# Patient Record
Sex: MALE | Race: WHITE | Employment: FULL TIME | URBAN - NONMETROPOLITAN AREA
[De-identification: names, ages, dates, MRNs, and addresses within clinical notes are randomized per-mention and may not be internally consistent; named-entity substitution may affect disease eponyms.]

---

## 2017-05-16 ENCOUNTER — TELEPHONE (OUTPATIENT)
Dept: FAMILY MEDICINE CLINIC | Facility: CLINIC | Age: 62
End: 2017-05-16

## 2017-07-17 ENCOUNTER — TELEPHONE (OUTPATIENT)
Dept: FAMILY MEDICINE CLINIC | Facility: CLINIC | Age: 62
End: 2017-07-17

## 2017-07-17 NOTE — TELEPHONE ENCOUNTER
1st no show for [facility] 7/17/17.  Spoke with pt and explained policy

## 2017-07-19 ENCOUNTER — OFFICE VISIT (OUTPATIENT)
Dept: FAMILY MEDICINE CLINIC | Facility: CLINIC | Age: 62
End: 2017-07-19

## 2017-07-19 VITALS
TEMPERATURE: 98 F | BODY MASS INDEX: 29.73 KG/M2 | OXYGEN SATURATION: 99 % | WEIGHT: 203 LBS | SYSTOLIC BLOOD PRESSURE: 130 MMHG | HEART RATE: 74 BPM | DIASTOLIC BLOOD PRESSURE: 86 MMHG | HEIGHT: 69.25 IN

## 2017-07-19 DIAGNOSIS — Z79.899 ENCOUNTER FOR LONG-TERM (CURRENT) USE OF MEDICATIONS: Primary | ICD-10-CM

## 2017-07-19 DIAGNOSIS — Z12.11 COLON CANCER SCREENING: ICD-10-CM

## 2017-07-19 DIAGNOSIS — I10 ESSENTIAL HYPERTENSION, BENIGN: ICD-10-CM

## 2017-07-19 PROCEDURE — 99213 OFFICE O/P EST LOW 20 MIN: CPT | Performed by: FAMILY MEDICINE

## 2017-07-19 RX ORDER — LISINOPRIL 10 MG/1
10 TABLET ORAL DAILY
Qty: 90 TABLET | Refills: 0 | Status: SHIPPED | OUTPATIENT
Start: 2017-07-19 | End: 2019-05-13

## 2017-07-19 RX ORDER — AMLODIPINE BESYLATE 5 MG/1
5 TABLET ORAL DAILY
Qty: 90 TABLET | Refills: 1 | Status: SHIPPED | OUTPATIENT
Start: 2017-07-19 | End: 2019-05-13

## 2017-07-19 NOTE — PROGRESS NOTES
Med Pérez is a 57year old male. Patient presents with:  HTN: HAS BEEN TAKING MEDS 2-3 TIMES A WEEK, NOT DAILY---- RM 6      HPI:   Patient presents for recheck of his  hypertension.  Pt has been taking medications as instructed, no medication side e without murmur  EXTREMITIES: no cyanosis, clubbing or edema    ASSESSMENT AND PLAN:     Problem List Items Addressed This Visit        Cardiovascular    Essential hypertension, benign    Overview     Blood Pressure and Cardiac Medications          lisinopr

## 2017-08-22 ENCOUNTER — TELEPHONE (OUTPATIENT)
Dept: FAMILY MEDICINE CLINIC | Facility: CLINIC | Age: 62
End: 2017-08-22

## 2018-06-12 ENCOUNTER — OFFICE VISIT (OUTPATIENT)
Dept: FAMILY MEDICINE CLINIC | Facility: CLINIC | Age: 63
End: 2018-06-12

## 2018-06-12 VITALS
HEART RATE: 80 BPM | SYSTOLIC BLOOD PRESSURE: 138 MMHG | OXYGEN SATURATION: 98 % | TEMPERATURE: 99 F | WEIGHT: 201 LBS | BODY MASS INDEX: 29 KG/M2 | DIASTOLIC BLOOD PRESSURE: 86 MMHG

## 2018-06-12 DIAGNOSIS — L30.9 DERMATITIS: ICD-10-CM

## 2018-06-12 DIAGNOSIS — R05.9 COUGH: Primary | ICD-10-CM

## 2018-06-12 DIAGNOSIS — D86.9 SARCOIDOSIS: ICD-10-CM

## 2018-06-12 PROCEDURE — 99214 OFFICE O/P EST MOD 30 MIN: CPT | Performed by: FAMILY MEDICINE

## 2018-06-12 RX ORDER — TRIAMCINOLONE ACETONIDE 5 MG/G
CREAM TOPICAL
Qty: 60 G | Refills: 1 | Status: SHIPPED | OUTPATIENT
Start: 2018-06-12 | End: 2021-07-14

## 2018-06-12 RX ORDER — PREDNISONE 20 MG/1
TABLET ORAL
Qty: 34 TABLET | Refills: 2 | Status: SHIPPED | OUTPATIENT
Start: 2018-06-12 | End: 2019-05-13 | Stop reason: ALTCHOICE

## 2018-06-12 NOTE — PROGRESS NOTES
HPI:   Danii Irwin is a 60year old male who presents for upper respiratory symptoms for  3  weeks. Patient reports sore throat only at the beginning of sx's, dry cough, OTC cold meds have not been helping, denies fever, denies sinus pain.     This is s without murmur  LYMPH: min ant cerv adenopathy    ASSESSMENT AND PLAN:     Sarcoidosis  Cough  (primary encounter diagnosis)  Dermatitis    Problem List Items Addressed This Visit        Infectious/Inflammatory    Sarcoidosis    Overview     Diagnosed prio

## 2018-06-12 NOTE — PATIENT INSTRUCTIONS
Pulmonary Sarcoidosis  Sarcoidosis is a disease that causes inflammation of the body tissues. This leads to small lumps called granulomas. The disease can affect any organ in the body. But it often starts in the lungs and lymph nodes.   What causes sarcoi · Prednisone. This is an anti-inflammatory steroid (corticosteroid). It helps prevent or reduce inflammation that can harm lungs. · Methotrexate. This medicine is commonly the first one used so that you can cut back on the dose of prednisone.  This helps l

## 2019-05-13 ENCOUNTER — OFFICE VISIT (OUTPATIENT)
Dept: FAMILY MEDICINE CLINIC | Facility: CLINIC | Age: 64
End: 2019-05-13
Payer: COMMERCIAL

## 2019-05-13 VITALS
OXYGEN SATURATION: 96 % | SYSTOLIC BLOOD PRESSURE: 110 MMHG | HEART RATE: 81 BPM | BODY MASS INDEX: 29.34 KG/M2 | TEMPERATURE: 99 F | WEIGHT: 200.38 LBS | DIASTOLIC BLOOD PRESSURE: 78 MMHG | HEIGHT: 69.25 IN

## 2019-05-13 DIAGNOSIS — R05.9 COUGH: ICD-10-CM

## 2019-05-13 DIAGNOSIS — R53.83 FATIGUE, UNSPECIFIED TYPE: Primary | ICD-10-CM

## 2019-05-13 DIAGNOSIS — I10 ESSENTIAL HYPERTENSION, BENIGN: ICD-10-CM

## 2019-05-13 DIAGNOSIS — D86.9 SARCOIDOSIS: ICD-10-CM

## 2019-05-13 PROCEDURE — 99214 OFFICE O/P EST MOD 30 MIN: CPT | Performed by: FAMILY MEDICINE

## 2019-05-13 RX ORDER — PREDNISONE 20 MG/1
TABLET ORAL
Qty: 34 TABLET | Refills: 1 | Status: SHIPPED | OUTPATIENT
Start: 2019-05-13 | End: 2021-07-14

## 2019-05-13 RX ORDER — LISINOPRIL 10 MG/1
10 TABLET ORAL DAILY
Qty: 90 TABLET | Refills: 1 | Status: SHIPPED | OUTPATIENT
Start: 2019-05-13 | End: 2021-07-14

## 2019-05-13 RX ORDER — AMLODIPINE BESYLATE 5 MG/1
5 TABLET ORAL DAILY
Qty: 90 TABLET | Refills: 1 | Status: SHIPPED | OUTPATIENT
Start: 2019-05-13 | End: 2021-07-14

## 2019-05-13 NOTE — PROGRESS NOTES
Zelalem Mathias is a 58year old male.   Patient presents with:  Joint Pain: inrm 5    Chief Complaint Reviewed and Verified  Nursing Notes Reviewed and Verified  Tobacco Reviewed  Allergies Reviewed  Medications Reviewed  Problem List Reviewed  Medical His medications as instructed, no medication side effects, home BP monitoring not recorded   Needs refill      ALLERGIES:  No Known Allergies        Current Outpatient Medications on File Prior to Visit:  triamcinolone acetonide 0.5 % External Cream Apply topi palpable.   EXTREMITIES: no cyanosis, clubbing or edema     ASSESSMENT AND PLAN:     Sarcoidosis  Essential hypertension, benign  Cough  Fatigue, unspecified type  (primary encounter diagnosis)    Problem List Items Addressed This Visit        Unprioritized understanding of these issues and agrees to the plan.

## 2019-05-13 NOTE — ASSESSMENT & PLAN NOTE
Will start prednisone  burst and taper, if after taper this resumes may need longer prednisone therapeutic window, but if fatigue is relieved with the sarcoid symptoms, it is likely that the fatigue was always the sarcoid symptom.

## 2020-01-04 ENCOUNTER — TELEPHONE (OUTPATIENT)
Dept: FAMILY MEDICINE CLINIC | Facility: CLINIC | Age: 65
End: 2020-01-04

## 2021-03-16 ENCOUNTER — PATIENT OUTREACH (OUTPATIENT)
Dept: FAMILY MEDICINE CLINIC | Facility: CLINIC | Age: 66
End: 2021-03-16

## 2021-07-14 ENCOUNTER — OFFICE VISIT (OUTPATIENT)
Dept: FAMILY MEDICINE CLINIC | Facility: CLINIC | Age: 66
End: 2021-07-14
Payer: COMMERCIAL

## 2021-07-14 VITALS
WEIGHT: 196 LBS | HEART RATE: 78 BPM | RESPIRATION RATE: 16 BRPM | SYSTOLIC BLOOD PRESSURE: 138 MMHG | OXYGEN SATURATION: 98 % | BODY MASS INDEX: 28.7 KG/M2 | TEMPERATURE: 98 F | HEIGHT: 69.25 IN | DIASTOLIC BLOOD PRESSURE: 86 MMHG

## 2021-07-14 DIAGNOSIS — S86.111A STRAIN OF RIGHT SOLEUS MUSCLE, INITIAL ENCOUNTER: Primary | ICD-10-CM

## 2021-07-14 DIAGNOSIS — D86.9 SARCOIDOSIS: ICD-10-CM

## 2021-07-14 DIAGNOSIS — L30.9 DERMATITIS: ICD-10-CM

## 2021-07-14 DIAGNOSIS — I10 ESSENTIAL HYPERTENSION, BENIGN: ICD-10-CM

## 2021-07-14 PROCEDURE — 99214 OFFICE O/P EST MOD 30 MIN: CPT | Performed by: FAMILY MEDICINE

## 2021-07-14 PROCEDURE — 3008F BODY MASS INDEX DOCD: CPT | Performed by: FAMILY MEDICINE

## 2021-07-14 PROCEDURE — 3075F SYST BP GE 130 - 139MM HG: CPT | Performed by: FAMILY MEDICINE

## 2021-07-14 PROCEDURE — 3079F DIAST BP 80-89 MM HG: CPT | Performed by: FAMILY MEDICINE

## 2021-07-14 RX ORDER — TRIAMCINOLONE ACETONIDE 5 MG/G
CREAM TOPICAL
Qty: 60 G | Refills: 1 | Status: SHIPPED | OUTPATIENT
Start: 2021-07-14

## 2021-07-14 RX ORDER — AMLODIPINE BESYLATE 5 MG/1
5 TABLET ORAL DAILY
Qty: 90 TABLET | Refills: 1 | Status: SHIPPED | OUTPATIENT
Start: 2021-07-14 | End: 2022-02-03

## 2021-07-14 RX ORDER — PREDNISONE 20 MG/1
TABLET ORAL
Qty: 34 TABLET | Refills: 1 | Status: SHIPPED | OUTPATIENT
Start: 2021-07-14 | End: 2021-08-27

## 2021-07-14 RX ORDER — LISINOPRIL 10 MG/1
10 TABLET ORAL DAILY
Qty: 90 TABLET | Refills: 1 | Status: SHIPPED | OUTPATIENT
Start: 2021-07-14 | End: 2022-02-03

## 2021-07-14 NOTE — PROGRESS NOTES
HPI/Subjective:   Griselda Puller is a 76year old male who presents for Pain (c/o right leg, knee and calf pain x 1 month )     Here for pain in the knee calf area  No known injury    This was the way he felt prior with sarcoid and started prednisone tape auscultation  CARDIO: RRR without murmur  GI: good BS's,no masses, HSM or tenderness  EXTREMITIES: no cyanosis, clubbing or edema  Negative homans  Soft calf  Pain in the upper calf area  No effusion  No crepitance  Flexion extension intact  Significant li

## 2021-07-23 ENCOUNTER — TELEPHONE (OUTPATIENT)
Dept: FAMILY MEDICINE CLINIC | Facility: CLINIC | Age: 66
End: 2021-07-23

## 2021-07-23 NOTE — TELEPHONE ENCOUNTER
Patient advised that Dr. Bo Vilchis is who he should see for the constant pain. Patient verbalized understanding and will call to see about scheduling.

## 2021-08-02 ENCOUNTER — MED REC SCAN ONLY (OUTPATIENT)
Dept: FAMILY MEDICINE CLINIC | Facility: CLINIC | Age: 66
End: 2021-08-02

## 2021-08-27 ENCOUNTER — OFFICE VISIT (OUTPATIENT)
Dept: FAMILY MEDICINE CLINIC | Facility: CLINIC | Age: 66
End: 2021-08-27
Payer: COMMERCIAL

## 2021-08-27 VITALS
HEIGHT: 69 IN | BODY MASS INDEX: 26.66 KG/M2 | SYSTOLIC BLOOD PRESSURE: 130 MMHG | TEMPERATURE: 97 F | WEIGHT: 180 LBS | HEART RATE: 83 BPM | DIASTOLIC BLOOD PRESSURE: 80 MMHG | OXYGEN SATURATION: 96 % | RESPIRATION RATE: 18 BRPM

## 2021-08-27 DIAGNOSIS — G56.22 ULNAR NEUROPATHY OF LEFT UPPER EXTREMITY: Primary | ICD-10-CM

## 2021-08-27 PROCEDURE — 99213 OFFICE O/P EST LOW 20 MIN: CPT | Performed by: FAMILY MEDICINE

## 2021-08-27 PROCEDURE — 3079F DIAST BP 80-89 MM HG: CPT | Performed by: FAMILY MEDICINE

## 2021-08-27 PROCEDURE — 3075F SYST BP GE 130 - 139MM HG: CPT | Performed by: FAMILY MEDICINE

## 2021-08-27 PROCEDURE — 3008F BODY MASS INDEX DOCD: CPT | Performed by: FAMILY MEDICINE

## 2021-08-27 RX ORDER — PREDNISONE 20 MG/1
TABLET ORAL
Qty: 34 TABLET | Refills: 1 | Status: SHIPPED | OUTPATIENT
Start: 2021-08-27 | End: 2022-02-04 | Stop reason: ALTCHOICE

## 2021-08-27 RX ORDER — TRAMADOL HYDROCHLORIDE 50 MG/1
50 TABLET ORAL DAILY
COMMUNITY
Start: 2021-08-02 | End: 2022-02-04 | Stop reason: ALTCHOICE

## 2021-08-29 NOTE — PROGRESS NOTES
HPI/Subjective:   Reinier Tucker is a 76year old male who presents for Pain (left arm)     Here with pain and tingle in the arm  Seems to be in the ulnar area with the 3-4 fingers left affected sl numb  Sometimes pain into the shouldr area    He states n extremity (Primary)  -     predniSONE; 2=40mg qd 4d then 20 mg qd 21 d then 1/2 tab = 10 mg 7 d  Dispense: 34 tablet;  Refill: 1          Noel Abrams MD, 8/29/2021, 4:36 PM

## 2021-09-01 ENCOUNTER — MED REC SCAN ONLY (OUTPATIENT)
Dept: FAMILY MEDICINE CLINIC | Facility: CLINIC | Age: 66
End: 2021-09-01

## 2021-09-29 ENCOUNTER — OFFICE VISIT (OUTPATIENT)
Dept: FAMILY MEDICINE CLINIC | Facility: CLINIC | Age: 66
End: 2021-09-29
Payer: COMMERCIAL

## 2021-09-29 VITALS
TEMPERATURE: 98 F | SYSTOLIC BLOOD PRESSURE: 126 MMHG | DIASTOLIC BLOOD PRESSURE: 74 MMHG | HEART RATE: 80 BPM | WEIGHT: 182 LBS | OXYGEN SATURATION: 97 % | HEIGHT: 69 IN | BODY MASS INDEX: 26.96 KG/M2

## 2021-09-29 DIAGNOSIS — G56.92 NEUROPATHY OF FINGER OF LEFT HAND: ICD-10-CM

## 2021-09-29 DIAGNOSIS — G56.22 ULNAR NEUROPATHY OF LEFT UPPER EXTREMITY: Primary | ICD-10-CM

## 2021-09-29 PROCEDURE — 99214 OFFICE O/P EST MOD 30 MIN: CPT | Performed by: FAMILY MEDICINE

## 2021-09-29 PROCEDURE — 3008F BODY MASS INDEX DOCD: CPT | Performed by: FAMILY MEDICINE

## 2021-09-29 PROCEDURE — 3074F SYST BP LT 130 MM HG: CPT | Performed by: FAMILY MEDICINE

## 2021-09-29 PROCEDURE — 3078F DIAST BP <80 MM HG: CPT | Performed by: FAMILY MEDICINE

## 2021-09-29 RX ORDER — GABAPENTIN 300 MG/1
300 CAPSULE ORAL 2 TIMES DAILY
Qty: 60 CAPSULE | Refills: 0 | Status: SHIPPED | OUTPATIENT
Start: 2021-09-29 | End: 2021-10-07 | Stop reason: DRUGHIGH

## 2021-09-29 NOTE — PROGRESS NOTES
Subjective:   Raji Garcia is a 78year old male who presents for Neurologic Problem (bilateral arms .  inrm 5)     Continue with the pain in the arm left only  The left hand in the 3-4 fingers at the tips  Tingle down arm from ulnar notch  And sometimes

## 2021-10-07 ENCOUNTER — TELEPHONE (OUTPATIENT)
Dept: FAMILY MEDICINE CLINIC | Facility: CLINIC | Age: 66
End: 2021-10-07

## 2021-10-07 RX ORDER — GABAPENTIN 600 MG/1
600 TABLET ORAL 2 TIMES DAILY
Qty: 60 TABLET | Refills: 1 | Status: SHIPPED | OUTPATIENT
Start: 2021-10-07 | End: 2021-12-14

## 2021-10-07 NOTE — TELEPHONE ENCOUNTER
Also, patient states he is supposed to have a nerve scan done. He is scheduled for 12/2/21 for this. Will forward to Dr. Raji Sr as an Everangel Lorenzo.

## 2021-10-07 NOTE — TELEPHONE ENCOUNTER
Spoke with patient who states he was placed on Gabapentin 300 mg BID for nerve pain in left arm. He states after 3 days the pay did not go away, so he increased his Gabapentin to 600 mg BID.  The pain still did not go away, so he added prednisone 20 mg jean-paul

## 2021-11-01 RX ORDER — GABAPENTIN 300 MG/1
CAPSULE ORAL
Qty: 60 CAPSULE | Refills: 0 | OUTPATIENT
Start: 2021-11-01

## 2021-11-14 DIAGNOSIS — S86.111A STRAIN OF RIGHT SOLEUS MUSCLE, INITIAL ENCOUNTER: ICD-10-CM

## 2021-11-15 NOTE — TELEPHONE ENCOUNTER
LOV 09/29/2021    LAST LAB none    LAST RX  Diclofenac #60 R3 07/14/2021    Next OV   Future Appointments   Date Time Provider Jose iYng   12/2/2021  2:15 PM Galen Jeans, MD [address] [facility] [address]

## 2021-12-02 ENCOUNTER — OFFICE VISIT (OUTPATIENT)
Dept: ELECTROPHYSIOLOGY | Facility: HOSPITAL | Age: 66
End: 2021-12-02
Attending: FAMILY MEDICINE
Payer: COMMERCIAL

## 2021-12-02 DIAGNOSIS — G56.03 BILATERAL CARPAL TUNNEL SYNDROME: Primary | ICD-10-CM

## 2021-12-02 PROCEDURE — 95886 MUSC TEST DONE W/N TEST COMP: CPT | Performed by: OTHER

## 2021-12-02 PROCEDURE — 95910 NRV CNDJ TEST 7-8 STUDIES: CPT | Performed by: OTHER

## 2021-12-03 NOTE — PROCEDURES
[facility], [address] [place] [address]      PATIENT'S NAME: Vicenta Bumpers   REFERRING PHYSICIAN: Kenyatta Rangel M.D.    PATIENT ACCOUNT #: [de-identified] LOCATION: [facility]   MEDICAL RECORD #: [id number] DATE OF BIRTH: [birth year]

## 2021-12-14 RX ORDER — GABAPENTIN 600 MG/1
TABLET ORAL
Qty: 60 TABLET | Refills: 1 | Status: SHIPPED | OUTPATIENT
Start: 2021-12-14

## 2021-12-14 NOTE — TELEPHONE ENCOUNTER
Last refill:  10/17/21  Qty 60  W/ 1 refills  Last ov: 09/29/21    Requested Prescriptions     Pending Prescriptions Disp Refills   • GABAPENTIN 600 MG Oral Tab [Pharmacy Med Name: GABAPENTIN 600MG TABLETS] 60 tablet 1     Sig: TAKE 1 TABLET(600 MG) BY CHANDRA

## 2022-02-03 RX ORDER — LISINOPRIL 10 MG/1
TABLET ORAL
Qty: 90 TABLET | Refills: 1 | Status: SHIPPED | OUTPATIENT
Start: 2022-02-03

## 2022-02-03 RX ORDER — AMLODIPINE BESYLATE 5 MG/1
TABLET ORAL
Qty: 90 TABLET | Refills: 1 | Status: SHIPPED | OUTPATIENT
Start: 2022-02-03

## 2022-02-04 ENCOUNTER — OFFICE VISIT (OUTPATIENT)
Dept: FAMILY MEDICINE CLINIC | Facility: CLINIC | Age: 67
End: 2022-02-04
Payer: COMMERCIAL

## 2022-02-04 VITALS
HEART RATE: 76 BPM | HEIGHT: 69 IN | DIASTOLIC BLOOD PRESSURE: 76 MMHG | WEIGHT: 206.5 LBS | BODY MASS INDEX: 30.59 KG/M2 | RESPIRATION RATE: 16 BRPM | TEMPERATURE: 98 F | OXYGEN SATURATION: 98 % | SYSTOLIC BLOOD PRESSURE: 128 MMHG

## 2022-02-04 DIAGNOSIS — G56.03 BILATERAL CARPAL TUNNEL SYNDROME: ICD-10-CM

## 2022-02-04 DIAGNOSIS — Z12.5 SCREENING PSA (PROSTATE SPECIFIC ANTIGEN): ICD-10-CM

## 2022-02-04 DIAGNOSIS — Z13.0 SCREENING, ANEMIA, DEFICIENCY, IRON: ICD-10-CM

## 2022-02-04 DIAGNOSIS — Z13.1 SCREENING FOR DIABETES MELLITUS: ICD-10-CM

## 2022-02-04 DIAGNOSIS — Z13.220 SCREENING, LIPID: ICD-10-CM

## 2022-02-04 DIAGNOSIS — Z00.00 LABORATORY EXAMINATION ORDERED AS PART OF A ROUTINE GENERAL MEDICAL EXAMINATION: Primary | ICD-10-CM

## 2022-02-04 LAB
ALBUMIN SERPL-MCNC: 4.1 G/DL (ref 3.4–5)
ALBUMIN/GLOB SERPL: 1.4 {RATIO} (ref 1–2)
ALP LIVER SERPL-CCNC: 85 U/L
ALT SERPL-CCNC: 33 U/L
ANION GAP SERPL CALC-SCNC: 6 MMOL/L (ref 0–18)
AST SERPL-CCNC: 22 U/L (ref 15–37)
BASOPHILS # BLD AUTO: 0.05 X10(3) UL (ref 0–0.2)
BASOPHILS NFR BLD AUTO: 0.8 %
BILIRUB SERPL-MCNC: 0.8 MG/DL (ref 0.1–2)
BUN BLD-MCNC: 14 MG/DL (ref 7–18)
CALCIUM BLD-MCNC: 8.9 MG/DL (ref 8.5–10.1)
CHLORIDE SERPL-SCNC: 107 MMOL/L (ref 98–112)
CHOLEST SERPL-MCNC: 190 MG/DL (ref ?–200)
CO2 SERPL-SCNC: 24 MMOL/L (ref 21–32)
COMPLEXED PSA SERPL-MCNC: 0.56 NG/ML (ref ?–4)
CREAT BLD-MCNC: 0.98 MG/DL
EOSINOPHIL # BLD AUTO: 0.06 X10(3) UL (ref 0–0.7)
EOSINOPHIL NFR BLD AUTO: 1 %
ERYTHROCYTE [DISTWIDTH] IN BLOOD BY AUTOMATED COUNT: 13.3 %
GLOBULIN PLAS-MCNC: 2.9 G/DL (ref 2.8–4.4)
GLUCOSE BLD-MCNC: 105 MG/DL (ref 70–99)
HCT VFR BLD AUTO: 45 %
HDLC SERPL-MCNC: 72 MG/DL (ref 40–59)
HGB BLD-MCNC: 15.2 G/DL
IMM GRANULOCYTES # BLD AUTO: 0.03 X10(3) UL (ref 0–1)
IMM GRANULOCYTES NFR BLD: 0.5 %
LDLC SERPL CALC-MCNC: 103 MG/DL (ref ?–100)
LYMPHOCYTES # BLD AUTO: 1.45 X10(3) UL (ref 1–4)
LYMPHOCYTES NFR BLD AUTO: 23.2 %
MCH RBC QN AUTO: 29.9 PG (ref 26–34)
MCHC RBC AUTO-ENTMCNC: 33.8 G/DL (ref 31–37)
MCV RBC AUTO: 88.6 FL
MONOCYTES # BLD AUTO: 0.5 X10(3) UL (ref 0.1–1)
MONOCYTES NFR BLD AUTO: 8 %
NEUTROPHILS # BLD AUTO: 4.16 X10(3) UL (ref 1.5–7.7)
NEUTROPHILS NFR BLD AUTO: 66.5 %
NONHDLC SERPL-MCNC: 118 MG/DL (ref ?–130)
OSMOLALITY SERPL CALC.SUM OF ELEC: 285 MOSM/KG (ref 275–295)
PLATELET # BLD AUTO: 165 10(3)UL (ref 150–450)
POTASSIUM SERPL-SCNC: 4 MMOL/L (ref 3.5–5.1)
PROT SERPL-MCNC: 7 G/DL (ref 6.4–8.2)
RBC # BLD AUTO: 5.08 X10(6)UL
SODIUM SERPL-SCNC: 137 MMOL/L (ref 136–145)
TRIGL SERPL-MCNC: 82 MG/DL (ref 30–149)
VLDLC SERPL CALC-MCNC: 14 MG/DL (ref 0–30)
WBC # BLD AUTO: 6.3 X10(3) UL (ref 4–11)

## 2022-02-04 PROCEDURE — 85025 COMPLETE CBC W/AUTO DIFF WBC: CPT | Performed by: FAMILY MEDICINE

## 2022-02-04 PROCEDURE — 3008F BODY MASS INDEX DOCD: CPT | Performed by: FAMILY MEDICINE

## 2022-02-04 PROCEDURE — 99214 OFFICE O/P EST MOD 30 MIN: CPT | Performed by: FAMILY MEDICINE

## 2022-02-04 PROCEDURE — 3078F DIAST BP <80 MM HG: CPT | Performed by: FAMILY MEDICINE

## 2022-02-04 PROCEDURE — 80061 LIPID PANEL: CPT | Performed by: FAMILY MEDICINE

## 2022-02-04 PROCEDURE — 3074F SYST BP LT 130 MM HG: CPT | Performed by: FAMILY MEDICINE

## 2022-02-04 PROCEDURE — 80053 COMPREHEN METABOLIC PANEL: CPT | Performed by: FAMILY MEDICINE

## 2022-02-07 NOTE — PROGRESS NOTES
Notify PSA normal. Recheck in 3 years--low risk  The lipids are great  chem is good  The sugar is minimal elevation but non fasting so okay  Cbc normal  Chem--kidney and liver are fine

## 2022-02-14 ENCOUNTER — TELEPHONE (OUTPATIENT)
Dept: ORTHOPEDICS CLINIC | Facility: CLINIC | Age: 67
End: 2022-02-14

## 2022-02-15 ENCOUNTER — OFFICE VISIT (OUTPATIENT)
Dept: ORTHOPEDICS CLINIC | Facility: CLINIC | Age: 67
End: 2022-02-15
Payer: COMMERCIAL

## 2022-02-15 VITALS — HEART RATE: 77 BPM | OXYGEN SATURATION: 98 %

## 2022-02-15 DIAGNOSIS — G56.01 CARPAL TUNNEL SYNDROME OF RIGHT WRIST: Primary | ICD-10-CM

## 2022-02-15 PROCEDURE — 99204 OFFICE O/P NEW MOD 45 MIN: CPT | Performed by: ORTHOPAEDIC SURGERY

## 2022-02-15 NOTE — PROGRESS NOTES
OR BOOKING SHEET   Nerve Decompression/Repair  Name: Jane Hdez  MRN: [id number]   : 1955  Diagnosis:  [x] Carpal tunnel syndrome of right wrist [G56.01]      Disposition:    [x] Outpatient  [] Overnight for PIEDAD  [] Overnight for observation and pain control  [] Inpatient procedure    Operative Time:    [] 45 min     [x] 1 hr     []  1.5 hr     [] 2 hr     []  2.5 hr      [] 3 hr      Antibiotics:   [x]  Ancef   []  Clindamycin     Laterality:   [x] RIGHT [] LEFT        [] BILATERAL    Surgical Consent:  Right endoscopic carpal tunnel release possible open    Procedure Codes:  [x] Endoscopic Carpal Tunnel Release (45500)    [] Cubital Tunnel Release (12116)    [] Open Carpal Tunnel Release (43888)  [] Guyon's Canal Release (17954)  [] Digital Nerve Repair, one nerve (35150)  [] Digital Nerve Repair, additional nerve (64528)  [] Use of Operative Microscope (63763)    Anesthesia Type:  [] General  [] Regional  [x] [place] Block  [] Monitored Anesthesia Care  [] Local Only (1% lidocaine, 0.5% marcaine - No Epi - 1:1 mix)  [] Local Only (1% lidocaine WITH epi, 0.5% marcaine - 1:1 mix)    Additional info:   [] PCP Clearance Needed  [] Cardiac Clearance    Equipment:  []  Local (1% lidocaine, 0.5% marcaine - No Epi - 1:1 mix)  [] Local (1% lidocaine WITH epi, 0.5% marcaine - 1:1 mix)  [x]  Microaire Endoscopic System  []  Lead Hand  []  Sterile Tourniquet  [] Operative Microscope    Positioning:   []  Supine with arm table on OR Table  [x]  Supine with arm table on transport cart    Assistant request:   []  Yes  [x]  No    Anesthesiologist Request:   None

## 2022-03-07 ENCOUNTER — TELEPHONE (OUTPATIENT)
Dept: ORTHOPEDICS CLINIC | Facility: CLINIC | Age: 67
End: 2022-03-07

## 2022-03-07 ENCOUNTER — LABORATORY ENCOUNTER (OUTPATIENT)
Dept: LAB | Age: 67
End: 2022-03-07
Attending: FAMILY MEDICINE
Payer: COMMERCIAL

## 2022-03-07 DIAGNOSIS — Z20.822 ENCOUNTER FOR PREOPERATIVE SCREENING LABORATORY TESTING FOR SEVERE ACUTE RESPIRATORY SYNDROME CORONAVIRUS 2 (SARS-COV-2): ICD-10-CM

## 2022-03-07 DIAGNOSIS — Z01.812 ENCOUNTER FOR PREOPERATIVE SCREENING LABORATORY TESTING FOR SEVERE ACUTE RESPIRATORY SYNDROME CORONAVIRUS 2 (SARS-COV-2): ICD-10-CM

## 2022-03-07 NOTE — TELEPHONE ENCOUNTER
Patient states he needs an order for a covid test before surgery on 3/9, Patient states he has been calling Pre-Admission all weekend but has not received a call back.

## 2022-03-07 NOTE — TELEPHONE ENCOUNTER
Surgeon: Dr. Catherine Villalobso    Date of Surgery: 3/9/22       Post Op Appt: 3/17/22    Facility: [facility]    Inpatient or Outpatient: Outpatient    Surgical Assistant: n/a    Preadmission Testing Ordered:  yes    Pre-Op Clearance Requested:   PCP: n/a   Cardiac: n/a   Pulmonary: n/a   Dental: n/a   Other: n/a    DME: n/a    Rehab Services Ordered:   Home Health: n/a   Outpatient: n/a    Is this work comp related? n/a    Prior Authorization: pending    Disability Paperwork: n/a    On [place] Calendar: yes    Notes:

## 2022-03-08 ENCOUNTER — OFFICE VISIT (OUTPATIENT)
Dept: FAMILY MEDICINE CLINIC | Facility: CLINIC | Age: 67
End: 2022-03-08
Payer: COMMERCIAL

## 2022-03-08 VITALS
HEIGHT: 70 IN | BODY MASS INDEX: 29.56 KG/M2 | TEMPERATURE: 97 F | DIASTOLIC BLOOD PRESSURE: 80 MMHG | SYSTOLIC BLOOD PRESSURE: 128 MMHG | RESPIRATION RATE: 12 BRPM | HEART RATE: 78 BPM | WEIGHT: 206.5 LBS | OXYGEN SATURATION: 97 %

## 2022-03-08 DIAGNOSIS — I10 ESSENTIAL HYPERTENSION, BENIGN: ICD-10-CM

## 2022-03-08 DIAGNOSIS — G56.01 CARPAL TUNNEL SYNDROME OF RIGHT WRIST: ICD-10-CM

## 2022-03-08 DIAGNOSIS — Z01.810 PREOP CARDIOVASCULAR EXAM: Primary | ICD-10-CM

## 2022-03-08 LAB — SARS-COV-2 RNA RESP QL NAA+PROBE: NOT DETECTED

## 2022-03-08 PROCEDURE — 99213 OFFICE O/P EST LOW 20 MIN: CPT | Performed by: FAMILY MEDICINE

## 2022-03-08 PROCEDURE — 3079F DIAST BP 80-89 MM HG: CPT | Performed by: FAMILY MEDICINE

## 2022-03-08 PROCEDURE — 3008F BODY MASS INDEX DOCD: CPT | Performed by: FAMILY MEDICINE

## 2022-03-08 PROCEDURE — 3074F SYST BP LT 130 MM HG: CPT | Performed by: FAMILY MEDICINE

## 2022-03-08 PROCEDURE — 93000 ELECTROCARDIOGRAM COMPLETE: CPT | Performed by: FAMILY MEDICINE

## 2022-03-08 RX ORDER — PREDNISONE 10 MG/1
10 TABLET ORAL DAILY
COMMUNITY

## 2022-03-08 RX ORDER — SODIUM CHLORIDE, SODIUM LACTATE, POTASSIUM CHLORIDE, CALCIUM CHLORIDE 600; 310; 30; 20 MG/100ML; MG/100ML; MG/100ML; MG/100ML
INJECTION, SOLUTION INTRAVENOUS CONTINUOUS
Status: CANCELLED | OUTPATIENT
Start: 2022-03-08

## 2022-03-09 ENCOUNTER — ANESTHESIA EVENT (OUTPATIENT)
Dept: SURGERY | Facility: HOSPITAL | Age: 67
End: 2022-03-09
Payer: COMMERCIAL

## 2022-03-09 ENCOUNTER — HOSPITAL ENCOUNTER (OUTPATIENT)
Facility: HOSPITAL | Age: 67
Setting detail: HOSPITAL OUTPATIENT SURGERY
Discharge: HOME | End: 2022-03-09
Attending: ORTHOPAEDIC SURGERY | Admitting: ORTHOPAEDIC SURGERY
Payer: COMMERCIAL

## 2022-03-09 ENCOUNTER — ANESTHESIA (OUTPATIENT)
Dept: SURGERY | Facility: HOSPITAL | Age: 67
End: 2022-03-09
Payer: COMMERCIAL

## 2022-03-09 VITALS
TEMPERATURE: 99 F | BODY MASS INDEX: 29.2 KG/M2 | DIASTOLIC BLOOD PRESSURE: 93 MMHG | OXYGEN SATURATION: 98 % | HEIGHT: 70 IN | RESPIRATION RATE: 17 BRPM | WEIGHT: 204 LBS | SYSTOLIC BLOOD PRESSURE: 158 MMHG | HEART RATE: 66 BPM

## 2022-03-09 DIAGNOSIS — G56.01 CARPAL TUNNEL SYNDROME ON RIGHT: ICD-10-CM

## 2022-03-09 PROCEDURE — 01N54ZZ RELEASE MEDIAN NERVE, PERCUTANEOUS ENDOSCOPIC APPROACH: ICD-10-PCS | Performed by: ORTHOPAEDIC SURGERY

## 2022-03-09 RX ORDER — ONDANSETRON 2 MG/ML
4 INJECTION INTRAMUSCULAR; INTRAVENOUS AS NEEDED
OUTPATIENT
Start: 2022-03-09 | End: 2022-03-09

## 2022-03-09 RX ORDER — NALOXONE HYDROCHLORIDE 0.4 MG/ML
80 INJECTION, SOLUTION INTRAMUSCULAR; INTRAVENOUS; SUBCUTANEOUS AS NEEDED
OUTPATIENT
Start: 2022-03-09 | End: 2022-03-09

## 2022-03-09 RX ORDER — ACETAMINOPHEN 500 MG
1000 TABLET ORAL ONCE AS NEEDED
OUTPATIENT
Start: 2022-03-09 | End: 2022-03-09

## 2022-03-09 RX ORDER — HYDROCODONE BITARTRATE AND ACETAMINOPHEN 5; 325 MG/1; MG/1
2 TABLET ORAL AS NEEDED
OUTPATIENT
Start: 2022-03-09

## 2022-03-09 RX ORDER — CEFAZOLIN SODIUM/WATER 2 G/20 ML
2 SYRINGE (ML) INTRAVENOUS ONCE
Status: COMPLETED | OUTPATIENT
Start: 2022-03-09 | End: 2022-03-09

## 2022-03-09 RX ORDER — ACETAMINOPHEN 500 MG
1000 TABLET ORAL ONCE
Status: DISCONTINUED | OUTPATIENT
Start: 2022-03-09 | End: 2022-03-09 | Stop reason: HOSPADM

## 2022-03-09 RX ORDER — HYDROMORPHONE HYDROCHLORIDE 1 MG/ML
0.4 INJECTION, SOLUTION INTRAMUSCULAR; INTRAVENOUS; SUBCUTANEOUS EVERY 5 MIN PRN
OUTPATIENT
Start: 2022-03-09 | End: 2022-03-09

## 2022-03-09 RX ORDER — HYDROCODONE BITARTRATE AND ACETAMINOPHEN 5; 325 MG/1; MG/1
1 TABLET ORAL AS NEEDED
OUTPATIENT
Start: 2022-03-09

## 2022-03-09 RX ORDER — LIDOCAINE HYDROCHLORIDE 10 MG/ML
INJECTION, SOLUTION EPIDURAL; INFILTRATION; INTRACAUDAL; PERINEURAL AS NEEDED
Status: DISCONTINUED | OUTPATIENT
Start: 2022-03-09 | End: 2022-03-09 | Stop reason: SURG

## 2022-03-09 RX ORDER — KETOROLAC TROMETHAMINE 30 MG/ML
15 INJECTION, SOLUTION INTRAMUSCULAR; INTRAVENOUS EVERY 6 HOURS PRN
OUTPATIENT
Start: 2022-03-09 | End: 2022-03-11

## 2022-03-09 RX ORDER — KETOROLAC TROMETHAMINE 30 MG/ML
30 INJECTION, SOLUTION INTRAMUSCULAR; INTRAVENOUS EVERY 6 HOURS PRN
OUTPATIENT
Start: 2022-03-09 | End: 2022-03-11

## 2022-03-09 RX ORDER — HYDROCODONE BITARTRATE AND ACETAMINOPHEN 5; 325 MG/1; MG/1
1 TABLET ORAL EVERY 6 HOURS PRN
Qty: 5 TABLET | Refills: 0 | Status: SHIPPED | OUTPATIENT
Start: 2022-03-09

## 2022-03-09 RX ORDER — SODIUM CHLORIDE, SODIUM LACTATE, POTASSIUM CHLORIDE, CALCIUM CHLORIDE 600; 310; 30; 20 MG/100ML; MG/100ML; MG/100ML; MG/100ML
INJECTION, SOLUTION INTRAVENOUS CONTINUOUS
OUTPATIENT
Start: 2022-03-09

## 2022-03-09 RX ORDER — SODIUM CHLORIDE, SODIUM LACTATE, POTASSIUM CHLORIDE, CALCIUM CHLORIDE 600; 310; 30; 20 MG/100ML; MG/100ML; MG/100ML; MG/100ML
INJECTION, SOLUTION INTRAVENOUS CONTINUOUS
Status: DISCONTINUED | OUTPATIENT
Start: 2022-03-09 | End: 2022-03-09

## 2022-03-09 RX ADMIN — SODIUM CHLORIDE, SODIUM LACTATE, POTASSIUM CHLORIDE, CALCIUM CHLORIDE: 600; 310; 30; 20 INJECTION, SOLUTION INTRAVENOUS at 13:32:00

## 2022-03-09 RX ADMIN — CEFAZOLIN SODIUM/WATER 2 G: 2 G/20 ML SYRINGE (ML) INTRAVENOUS at 13:34:00

## 2022-03-09 RX ADMIN — LIDOCAINE HYDROCHLORIDE 20 ML: 10 INJECTION, SOLUTION EPIDURAL; INFILTRATION; INTRACAUDAL; PERINEURAL at 13:37:00

## 2022-03-09 NOTE — OPERATIVE REPORT
Operative Note    Patient Name: Evelia Galvez    Preoperative Diagnosis:     1. Carpal tunnel syndrome on right [G56.01]    Postoperative Diagnosis:     1. Carpal tunnel syndrome on right [G56.01]    Surgeon(s) and Role:     Virgie Gamboa MD - Primary     Assistant: None    Procedures:     1. Right endoscopic carpal tunnel release (22756)    Antibiotics: Ancef 2g    Surgical Findings: Normal Anatomy     Anesthesia: [place] Block    Complications: None    Implants: None    Specimen: None    Condition: Stable    Estimated Blood Loss: * No blood loss amount entered *     Indications:  78year old male with EMG/NCV confirmed R CTS that failed non-surgical management. . Patient consented to an endoscopic carpal tunnel release possible open having understood the risks associated with surgery, expected outcome, time to recovery and the need for possible rehab. Risks that were discussed but not limited to infection, nerve injury, tendon injury, artery injury, need for additional surgery, and no improvements of present symptoms. Procedure:  Patient was met in the preoperative holding area where consent was verified, laterality was marked with the surgeon's initials, and the H&P was updated. Patient was brought to the operating room on a transport cart. Patient was then transferred onto the operating room table and placed in supine position with an arm table and with bony prominences well-padded. SCDs were placed. Antibiotics were fully infused. An upper arm tourniquet was placed and the limb was exsanguinated using Esmarch bandage. Tourniquet was raised to 250mmHg. A deniz block was subsequently performed. The arm was then prepped and draped in the usual sterile fashion. A surgical timeout was performed. A transverse incision through the dermis was made 5mm proximal to the distal volar wrist crease just ulnar to the palmaris longus using a 15 blade.   Realon's and Chantale Retanattayo scissors was used to dissect through the subcutaneous tissue onto the antebrachial fascia. A distally based 1 cm wide rectangular flap was elevated using a [place] blade. The flap was retracted distally and volarly allowing access to the carpal tunnel. The undersurface of the transverse carpal ligament was accessed, cleaned, and dilated. After assessing the width of the ligament, the microaire endoscopic apparatus with camera was inserted into the carpal tunnel. Under camera magnification with direct visualization of the undersurface of the transverse carpal ligament, the blade was engaged at the distal extents of the ligament and the release of the ligament was carried out distal to proximal.  I verified the release with the endoscopy camera noting divergent edges of the transverse carpal ligament. I also physically verified with a tenosynovium elevator complete release of the transverse carpal ligament. The tourniquet was then lowered and bipolar cautery was used to achieve hemostasis. Closure: The incision was closed using 4-0 Monocryl in a buried simple interrupted fashion. Exofin skin glue was applied and Steri-Strips were placed. Dressing/Splint:  Tegaderm with a pad was applied over the endoscopic carpal tunnel incision. Post Operative:  Patient was woken up from anesthesia and taken to PACU for further recovery and discharge home. Bernardino Brown MD  Hand, Wrist, & Elbow Surgery  [facility]  [facility] [address], [address] [facility], [address] [place] [address]  [email or web address]  t: [id number]  f: [phone]

## 2022-03-09 NOTE — INTERVAL H&P NOTE
Pre-op Diagnosis: Carpal tunnel syndrome on right [G56.01]    The above referenced H&P was reviewed by Conrado Lane MD on 3/9/2022, the patient was examined and no significant changes have occurred in the patient's condition since the H&P was performed. I discussed with the patient and/or legal representative the potential benefits, risks and side effects of this procedure; the likelihood of the patient achieving goals; and potential problems that might occur during recuperation. I discussed reasonable alternatives to the procedure, including risks, benefits and side effects related to the alternatives and risks related to not receiving this procedure. We will proceed with procedure as planned.

## 2022-03-14 RX ORDER — GABAPENTIN 600 MG/1
TABLET ORAL
Qty: 60 TABLET | Refills: 1 | Status: SHIPPED | OUTPATIENT
Start: 2022-03-14

## 2022-03-14 NOTE — TELEPHONE ENCOUNTER
LOV 03/08/2022    LAST LAB 02/04/2022    LAST RX  Gabapentin #60 R1 12/14/2021    Next OV   Future Appointments   Date Time Provider Jose Ying   3/24/2022  4:00 PM Khushboo Christie MD [facility]     PROTOCOL

## 2022-03-24 ENCOUNTER — TELEPHONE (OUTPATIENT)
Dept: ORTHOPEDICS CLINIC | Facility: CLINIC | Age: 67
End: 2022-03-24

## 2022-03-24 ENCOUNTER — OFFICE VISIT (OUTPATIENT)
Dept: ORTHOPEDICS CLINIC | Facility: CLINIC | Age: 67
End: 2022-03-24
Payer: COMMERCIAL

## 2022-03-24 VITALS — BODY MASS INDEX: 28.63 KG/M2 | WEIGHT: 200 LBS | HEIGHT: 70 IN

## 2022-03-24 DIAGNOSIS — G56.01 CARPAL TUNNEL SYNDROME ON RIGHT: ICD-10-CM

## 2022-03-24 DIAGNOSIS — G56.02 CARPAL TUNNEL SYNDROME OF LEFT WRIST: Primary | ICD-10-CM

## 2022-03-24 PROCEDURE — 99024 POSTOP FOLLOW-UP VISIT: CPT | Performed by: ORTHOPAEDIC SURGERY

## 2022-03-24 PROCEDURE — 3008F BODY MASS INDEX DOCD: CPT | Performed by: ORTHOPAEDIC SURGERY

## 2022-03-24 NOTE — H&P (VIEW-ONLY)
[facility] Post-Op Clinic Visit    A/P: 78year old  male s/p right endoscopic carpal tunnel release on 3/9/2022 progressing as expected. Pleased that the patient has noted sensory recovery. Patient is interested in moving forward with left endoscopic carpal tunnel release. Patient will be scheduled for April 15.  1 week follow-up    Weight Bearing Restrictions: no restrictions  Wound Care: no restrictions    Next Visit: 1 week postop    HPI:  Patient reports minimal post-op pain. Patient has noted improvement in his sensation    No nausea/vomiting. No fevers/chills. Physical Exam:  General: Alert. Oriented x3. Appears comfortable. Right Upper Extremity:   Skin: incision healing appropriately w/o signs of infection or dehiscence  Edema: resolving post-op edema  Neuro: normal sensation in ulnar and radial sensory nerve distribution distally. normal motor function of median, ulnar, AIN, and PIN inntervated muscles distally  Thumb: improved up to IP  Index finger:up to PIP  Middle finger: up to PIP  Ring finger: up to PIP  Motion: full composite fist        Ole Dias MD  Hand, Wrist, & Elbow Surgery  [facility]  [facility] [address], [address] [facility]  [email or web address]  t: [id number]  f: [phone]

## 2022-03-24 NOTE — PROGRESS NOTES
OR BOOKING SHEET   Nerve Decompression/Repair  Name: Emmie Monk  MRN: [id number]   : 1955  Diagnosis:  [x] Carpal tunnel syndrome of left wrist [G56.02]    Disposition:    [x] Outpatient  [] Overnight for PIEDAD  [] Overnight for observation and pain control  [] Inpatient procedure    Operative Time:    [] 45 min     [x] 1 hr     []  1.5 hr     [] 2 hr     []  2.5 hr      [] 3 hr      Antibiotics:   [x]  Ancef   []  Clindamycin     Laterality:   [] RIGHT [x] LEFT        [] BILATERAL    Surgical Consent:  Left endoscopic carpal tunnel release, possible open    Procedure Codes:  [x] Endoscopic Carpal Tunnel Release (95237)    [] Cubital Tunnel Release (33387)    [] Open Carpal Tunnel Release (26697)  [] Guyon's Canal Release (95657)  [] Digital Nerve Repair, one nerve (08000)  [] Digital Nerve Repair, additional nerve (66348)  [] Use of Operative Microscope (10899)    Anesthesia Type:  [] General  [] Regional  [x] [place] Block  [] Monitored Anesthesia Care  [] Local Only (1% lidocaine, 0.5% marcaine - No Epi - 1:1 mix)  [] Local Only (1% lidocaine WITH epi, 0.5% marcaine - 1:1 mix)    Additional info:   [] PCP Clearance Needed  [] Cardiac Clearance    Equipment:  []  Local (1% lidocaine, 0.5% marcaine - No Epi - 1:1 mix)  [] Local (1% lidocaine WITH epi, 0.5% marcaine - 1:1 mix)  [x]  Microaire Endoscopic System  []  Lead Hand  []  Sterile Tourniquet  [] Operative Microscope    Positioning:   []  Supine with arm table on OR Table  [x]  Supine with arm table on transport cart    Assistant request:   []  Yes  [x]  No    Anesthesiologist Request:   None

## 2022-03-24 NOTE — PROGRESS NOTES
[facility] Post-Op Clinic Visit    A/P: 78year old  male s/p right endoscopic carpal tunnel release on 3/9/2022 progressing as expected. Pleased that the patient has noted sensory recovery. Patient is interested in moving forward with left endoscopic carpal tunnel release. Patient will be scheduled for April 15.  1 week follow-up    Weight Bearing Restrictions: no restrictions  Wound Care: no restrictions    Next Visit: 1 week postop    HPI:  Patient reports minimal post-op pain. Patient has noted improvement in his sensation    No nausea/vomiting. No fevers/chills. Physical Exam:  General: Alert. Oriented x3. Appears comfortable. Right Upper Extremity:   Skin: incision healing appropriately w/o signs of infection or dehiscence  Edema: resolving post-op edema  Neuro: normal sensation in ulnar and radial sensory nerve distribution distally. normal motor function of median, ulnar, AIN, and PIN inntervated muscles distally  Thumb: improved up to IP  Index finger:up to PIP  Middle finger: up to PIP  Ring finger: up to PIP  Motion: full composite fist        Kim Winters MD  Hand, Wrist, & Elbow Surgery  [facility]  [facility] [address], [address] [facility], [address] [place] [address]  [email or web address]  t: [id number]  f: [phone]

## 2022-03-24 NOTE — TELEPHONE ENCOUNTER
Surgeon: Dr. Criss Martínez    Date of Surgery: 4/16/22        Post Op Appt: 4/22/22     Facility: [facility]    Inpatient or Outpatient: Outpatient    Surgical Assistant: n/a    Preadmission Testing Ordered:  n/a    Pre-Op Clearance Requested:   PCP: n/a   Cardiac: n/a   Pulmonary: n/a   Dental: n/a   Other: n/a    DME: n/a    Rehab Services Ordered:   Home Health: n/a   Outpatient: no    Is this work comp related? no    Prior Authorization: pending    Disability Paperwork: n/a    On [place] Calendar: yes    Notes:

## 2022-03-25 NOTE — TELEPHONE ENCOUNTER
Per Marquis Farley at [phone] no prior auth or precert required for OP CPT CODE 82908 call reference #[email or web address] @1:11pm

## 2022-04-13 ENCOUNTER — LABORATORY ENCOUNTER (OUTPATIENT)
Dept: LAB | Age: 67
End: 2022-04-13
Attending: FAMILY MEDICINE
Payer: COMMERCIAL

## 2022-04-13 DIAGNOSIS — Z20.822 ENCOUNTER FOR PREOPERATIVE SCREENING LABORATORY TESTING FOR COVID-19 VIRUS: ICD-10-CM

## 2022-04-13 DIAGNOSIS — Z01.812 ENCOUNTER FOR PREOPERATIVE SCREENING LABORATORY TESTING FOR COVID-19 VIRUS: ICD-10-CM

## 2022-04-13 RX ORDER — ACETAMINOPHEN 500 MG
500 TABLET ORAL EVERY 6 HOURS PRN
COMMUNITY

## 2022-04-13 NOTE — TELEPHONE ENCOUNTER
DOS change 4.15.22 to 4.16.22    Per Velasquez Vale at [phone] no prior auth or precert required for OP CPT CODE 23503 call reference #[email or web address] @1:11pm

## 2022-04-14 LAB — SARS-COV-2 RNA RESP QL NAA+PROBE: NOT DETECTED

## 2022-04-16 ENCOUNTER — ANESTHESIA EVENT (OUTPATIENT)
Dept: SURGERY | Facility: HOSPITAL | Age: 67
End: 2022-04-16
Payer: COMMERCIAL

## 2022-04-16 ENCOUNTER — HOSPITAL ENCOUNTER (OUTPATIENT)
Facility: HOSPITAL | Age: 67
Setting detail: HOSPITAL OUTPATIENT SURGERY
Discharge: HOME | End: 2022-04-16
Attending: ORTHOPAEDIC SURGERY | Admitting: ORTHOPAEDIC SURGERY
Payer: COMMERCIAL

## 2022-04-16 ENCOUNTER — ANESTHESIA (OUTPATIENT)
Dept: SURGERY | Facility: HOSPITAL | Age: 67
End: 2022-04-16
Payer: COMMERCIAL

## 2022-04-16 VITALS
HEIGHT: 70 IN | TEMPERATURE: 98 F | RESPIRATION RATE: 16 BRPM | OXYGEN SATURATION: 95 % | SYSTOLIC BLOOD PRESSURE: 148 MMHG | BODY MASS INDEX: 29.51 KG/M2 | DIASTOLIC BLOOD PRESSURE: 99 MMHG | HEART RATE: 65 BPM | WEIGHT: 206.13 LBS

## 2022-04-16 DIAGNOSIS — Z01.812 ENCOUNTER FOR PREOPERATIVE SCREENING LABORATORY TESTING FOR COVID-19 VIRUS: Primary | ICD-10-CM

## 2022-04-16 DIAGNOSIS — G56.02 CARPAL TUNNEL SYNDROME ON LEFT: ICD-10-CM

## 2022-04-16 DIAGNOSIS — Z20.822 ENCOUNTER FOR PREOPERATIVE SCREENING LABORATORY TESTING FOR COVID-19 VIRUS: Primary | ICD-10-CM

## 2022-04-16 PROCEDURE — 01N54ZZ RELEASE MEDIAN NERVE, PERCUTANEOUS ENDOSCOPIC APPROACH: ICD-10-PCS | Performed by: ORTHOPAEDIC SURGERY

## 2022-04-16 RX ORDER — METOCLOPRAMIDE HYDROCHLORIDE 5 MG/ML
10 INJECTION INTRAMUSCULAR; INTRAVENOUS AS NEEDED
Status: DISCONTINUED | OUTPATIENT
Start: 2022-04-16 | End: 2022-04-16

## 2022-04-16 RX ORDER — SODIUM CHLORIDE, SODIUM LACTATE, POTASSIUM CHLORIDE, CALCIUM CHLORIDE 600; 310; 30; 20 MG/100ML; MG/100ML; MG/100ML; MG/100ML
INJECTION, SOLUTION INTRAVENOUS CONTINUOUS
Status: DISCONTINUED | OUTPATIENT
Start: 2022-04-16 | End: 2022-04-16

## 2022-04-16 RX ORDER — CEFAZOLIN SODIUM/WATER 2 G/20 ML
SYRINGE (ML) INTRAVENOUS
Status: DISCONTINUED
Start: 2022-04-16 | End: 2022-04-16

## 2022-04-16 RX ORDER — HYDROCODONE BITARTRATE AND ACETAMINOPHEN 10; 325 MG/1; MG/1
2 TABLET ORAL AS NEEDED
Status: DISCONTINUED | OUTPATIENT
Start: 2022-04-16 | End: 2022-04-16

## 2022-04-16 RX ORDER — HYDROCODONE BITARTRATE AND ACETAMINOPHEN 10; 325 MG/1; MG/1
1 TABLET ORAL AS NEEDED
Status: DISCONTINUED | OUTPATIENT
Start: 2022-04-16 | End: 2022-04-16

## 2022-04-16 RX ORDER — MEPERIDINE HYDROCHLORIDE 25 MG/ML
12.5 INJECTION INTRAMUSCULAR; INTRAVENOUS; SUBCUTANEOUS AS NEEDED
Status: DISCONTINUED | OUTPATIENT
Start: 2022-04-16 | End: 2022-04-16

## 2022-04-16 RX ORDER — CEFAZOLIN SODIUM/WATER 2 G/20 ML
2 SYRINGE (ML) INTRAVENOUS ONCE
Status: COMPLETED | OUTPATIENT
Start: 2022-04-16 | End: 2022-04-16

## 2022-04-16 RX ORDER — ONDANSETRON 2 MG/ML
4 INJECTION INTRAMUSCULAR; INTRAVENOUS AS NEEDED
Status: DISCONTINUED | OUTPATIENT
Start: 2022-04-16 | End: 2022-04-16

## 2022-04-16 RX ORDER — MIDAZOLAM HYDROCHLORIDE 1 MG/ML
1 INJECTION INTRAMUSCULAR; INTRAVENOUS EVERY 5 MIN PRN
Status: DISCONTINUED | OUTPATIENT
Start: 2022-04-16 | End: 2022-04-16

## 2022-04-16 RX ORDER — ACETAMINOPHEN 500 MG
1000 TABLET ORAL ONCE
Status: DISCONTINUED | OUTPATIENT
Start: 2022-04-16 | End: 2022-04-16 | Stop reason: HOSPADM

## 2022-04-16 RX ORDER — HYDROMORPHONE HYDROCHLORIDE 1 MG/ML
0.4 INJECTION, SOLUTION INTRAMUSCULAR; INTRAVENOUS; SUBCUTANEOUS EVERY 5 MIN PRN
Status: DISCONTINUED | OUTPATIENT
Start: 2022-04-16 | End: 2022-04-16

## 2022-04-16 RX ORDER — NALOXONE HYDROCHLORIDE 0.4 MG/ML
80 INJECTION, SOLUTION INTRAMUSCULAR; INTRAVENOUS; SUBCUTANEOUS AS NEEDED
Status: DISCONTINUED | OUTPATIENT
Start: 2022-04-16 | End: 2022-04-16

## 2022-04-16 RX ORDER — HYDROCODONE BITARTRATE AND ACETAMINOPHEN 5; 325 MG/1; MG/1
1 TABLET ORAL EVERY 6 HOURS PRN
Qty: 5 TABLET | Refills: 0 | Status: SHIPPED | OUTPATIENT
Start: 2022-04-16

## 2022-04-16 RX ADMIN — SODIUM CHLORIDE, SODIUM LACTATE, POTASSIUM CHLORIDE, CALCIUM CHLORIDE: 600; 310; 30; 20 INJECTION, SOLUTION INTRAVENOUS at 07:36:00

## 2022-04-16 RX ADMIN — CEFAZOLIN SODIUM/WATER 2 G: 2 G/20 ML SYRINGE (ML) INTRAVENOUS at 07:46:00

## 2022-04-16 RX ADMIN — SODIUM CHLORIDE, SODIUM LACTATE, POTASSIUM CHLORIDE, CALCIUM CHLORIDE: 600; 310; 30; 20 INJECTION, SOLUTION INTRAVENOUS at 08:11:00

## 2022-04-16 NOTE — OPERATIVE REPORT
Operative Note    Patient Name: Natalio Child    Preoperative Diagnosis:     1. Carpal tunnel syndrome on left [G56.02]    Postoperative Diagnosis:     1. Carpal tunnel syndrome on left [G56.02]    Surgeon(s) and Role:     Renetta Duncan MD - Primary     Assistant: None    Procedures:     1. Left endoscopic carpal tunnel release (77649)    Antibiotics: ancef 2g    Surgical Findings: Normal Anatomy     Anesthesia: [place] Block    Complications: None    Implants: None    Specimen: None    Condition: Stable    Estimated Blood Loss: 1mL    Indications:  78year old male with EMG/NCV confirmed left carpal tunnel syndrome that failed non-surgical management. Patient consented to an endoscopic carpal tunnel release possible open having understood the risks associated with surgery, expected outcome, time to recovery and the need for possible rehab. Risks that were discussed but not limited to infection, nerve injury, tendon injury, artery injury, need for additional surgery, and no improvements of present symptoms. Procedure:  Patient was met in the preoperative holding area where consent was verified, laterality was marked with the surgeon's initials, and the H&P was updated. Patient was brought to the operating room on a transport cart. Patient was then transferred onto the operating room table and placed in supine position with an arm table and with bony prominences well-padded. SCDs were placed. Antibiotics were fully infused. An upper arm tourniquet was placed and the limb was exsanguinated using Esmarch bandage. Tourniquet was raised to 250mmHg. A deniz block was subsequently performed. The arm was then prepped and draped in the usual sterile fashion. A surgical timeout was performed. A transverse incision through the dermis was made 5mm proximal to the distal volar wrist crease just ulnar to the palmaris longus using a 15 blade.   Flor's and Dima Null scissors was used to dissect through the subcutaneous tissue onto the antebrachial fascia. A distally based 1 cm wide rectangular flap was elevated using a [place] blade. The flap was retracted distally and volarly allowing access to the carpal tunnel. The undersurface of the transverse carpal ligament was accessed, cleaned, and dilated. After assessing the width of the ligament, the microaire endoscopic apparatus with camera was inserted into the carpal tunnel. Under camera magnification with direct visualization of the undersurface of the transverse carpal ligament, the blade was engaged at the distal extents of the ligament and the release of the ligament was carried out distal to proximal.  I verified the release with the endoscopy camera noting divergent edges of the transverse carpal ligament. I also physically verified with a tenosynovium elevator complete release of the transverse carpal ligament. The tourniquet was then lowered and bipolar cautery was used to achieve hemostasis. Closure: The incision was closed using 4-0 Monocryl in a buried simple interrupted fashion. Exofin skin glue was applied and Steri-Strips were placed. Dressing/Splint:  Tegaderm with a pad was applied over the endoscopic carpal tunnel incision. Post Operative:  Patient was woken up from anesthesia and taken to PACU for further recovery and discharge home. Glen Rivers MD  Hand, Wrist, & Elbow Surgery  [facility] Orthopaedic Surgery  [facility] [address], [address]  [email or web address]  t: [id number]  f: [phone]

## 2022-04-16 NOTE — INTERVAL H&P NOTE
Pre-op Diagnosis: Carpal tunnel syndrome on left [G56.02]    The above referenced H&P was reviewed by Trina Cano MD on 4/16/2022, the patient was examined and no significant changes have occurred in the patient's condition since the H&P was performed. I discussed with the patient and/or legal representative the potential benefits, risks and side effects of this procedure; the likelihood of the patient achieving goals; and potential problems that might occur during recuperation. I discussed reasonable alternatives to the procedure, including risks, benefits and side effects related to the alternatives and risks related to not receiving this procedure. We will proceed with procedure as planned.

## 2022-04-22 ENCOUNTER — OFFICE VISIT (OUTPATIENT)
Dept: ORTHOPEDICS CLINIC | Facility: CLINIC | Age: 67
End: 2022-04-22
Payer: COMMERCIAL

## 2022-04-22 VITALS — HEIGHT: 70 IN | BODY MASS INDEX: 29.49 KG/M2 | WEIGHT: 206 LBS

## 2022-04-22 DIAGNOSIS — G56.02 CARPAL TUNNEL SYNDROME ON LEFT: Primary | ICD-10-CM

## 2022-04-22 PROCEDURE — 99024 POSTOP FOLLOW-UP VISIT: CPT | Performed by: ORTHOPAEDIC SURGERY

## 2022-04-22 PROCEDURE — 3008F BODY MASS INDEX DOCD: CPT | Performed by: ORTHOPAEDIC SURGERY

## 2022-04-22 NOTE — PROGRESS NOTES
[facility] Visit    A/P: 78year old  male s/p right endoscopic carpal tunnel release on 3/9/2022 and left endoscopic carpal tunnel release on 4/16/2022. Patient continues to have sensory recovery both on the right side and left side. Continue to monitor for improvement in sensation. Weight Bearing Restrictions: no restrictions  Wound Care: no restrictions    Next Visit: 5 weeks    HPI:  Patient has resolution of nighttime symptoms in the left side. He is very happy with his results thus far. No nausea/vomiting. No fevers/chills. Physical Exam:  General: Alert. Oriented x3. Appears comfortable. Right Upper Extremity:   Skin: incision healed   Edema: resolved post-op edema  Neuro: normal sensation in ulnar and radial sensory nerve distribution distally. normal motor function of median, ulnar, AIN, and PIN inntervated muscles distally  Thumb: Normal  Index finger: normal to DIP  Middle finger: Normal to DIP  Ring finger: Normal to DIP  Motion: full composite fist    Left Upper Extremity:   Skin: incision healing appropriately w/o signs of infection or dehiscence  Edema: resolving post-op edema  Neuro: normal sensation in ulnar and radial sensory nerve distribution distally. normal motor function of median, ulnar, AIN, and PIN inntervated muscles distally  Thumb: to IP  Index finger: normal to PIP  Middle finger: Normal to MCP  Ring finger: Normal to MCP  Motion: full composite fist        Adin Walden MD  Hand, Wrist, & Elbow Surgery  [facility]  On license of [facility], [address] [facility], [address] [place] [address]. [facility]  [email or web address]  t: [id number]  f: [phone]

## 2022-05-27 ENCOUNTER — OFFICE VISIT (OUTPATIENT)
Dept: ORTHOPEDICS CLINIC | Facility: CLINIC | Age: 67
End: 2022-05-27
Payer: COMMERCIAL

## 2022-05-27 VITALS — BODY MASS INDEX: 27.92 KG/M2 | WEIGHT: 195 LBS | HEIGHT: 70 IN

## 2022-05-27 DIAGNOSIS — G56.02 CARPAL TUNNEL SYNDROME ON LEFT: Primary | ICD-10-CM

## 2022-05-27 DIAGNOSIS — G56.01 CARPAL TUNNEL SYNDROME ON RIGHT: ICD-10-CM

## 2022-05-27 PROCEDURE — 3008F BODY MASS INDEX DOCD: CPT | Performed by: ORTHOPAEDIC SURGERY

## 2022-05-27 PROCEDURE — 99024 POSTOP FOLLOW-UP VISIT: CPT | Performed by: ORTHOPAEDIC SURGERY

## 2022-05-27 NOTE — PROGRESS NOTES
[facility] Visit    A/P: 78year old  male s/p right endoscopic carpal tunnel release on 3/9/2022 and left endoscopic carpal tunnel release on 4/16/2022. He is close to full resolution of symptoms on his right side. The left side he has some residual numbness to only the distal tips of his fingers. He is very happy with recovery status far. He will see us on an as-needed basis    Weight Bearing Restrictions: no restrictions  Wound Care: no restrictions    Next Visit: As needed    HPI:  Patient has resolution of right-sided symptoms almost completely gone the left side. He has some pain with gripping or grasping but it is getting better on the right side. Physical Exam:  General: Alert. Oriented x3. Appears comfortable. Right Upper Extremity:   Skin: incision healed   Edema: resolved post-op edema  Neuro: normal sensation in ulnar and radial sensory nerve distribution distally. normal motor function of median, ulnar, AIN, and PIN inntervated muscles distally, normal sensation on the right hand in the median nerve distribution. Thumb: Normal  L Index finger: normal to DIP  L Middle finger: Normal to DIP  L Ring finger: Normal   Motion: full composite fist      Marielos Hall PA-C  Hand, Wrist, & Elbow Surgery  Physician Assistant to Dr. Joseph Nolasco, [address], [place] [address]. [email or web address]  t: [id number]  f: [phone]

## 2022-06-03 ENCOUNTER — OFFICE VISIT (OUTPATIENT)
Dept: FAMILY MEDICINE CLINIC | Facility: CLINIC | Age: 67
End: 2022-06-03
Payer: COMMERCIAL

## 2022-06-03 VITALS
TEMPERATURE: 98 F | WEIGHT: 198 LBS | OXYGEN SATURATION: 98 % | HEIGHT: 70 IN | HEART RATE: 68 BPM | SYSTOLIC BLOOD PRESSURE: 138 MMHG | RESPIRATION RATE: 16 BRPM | BODY MASS INDEX: 28.35 KG/M2 | DIASTOLIC BLOOD PRESSURE: 80 MMHG

## 2022-06-03 DIAGNOSIS — M19.042 PRIMARY OSTEOARTHRITIS OF BOTH HANDS: Primary | ICD-10-CM

## 2022-06-03 DIAGNOSIS — D86.9 SARCOIDOSIS: ICD-10-CM

## 2022-06-03 DIAGNOSIS — Z12.11 COLON CANCER SCREENING: ICD-10-CM

## 2022-06-03 DIAGNOSIS — M19.041 PRIMARY OSTEOARTHRITIS OF BOTH HANDS: Primary | ICD-10-CM

## 2022-06-03 PROCEDURE — 3008F BODY MASS INDEX DOCD: CPT | Performed by: FAMILY MEDICINE

## 2022-06-03 PROCEDURE — 3079F DIAST BP 80-89 MM HG: CPT | Performed by: FAMILY MEDICINE

## 2022-06-03 PROCEDURE — 99213 OFFICE O/P EST LOW 20 MIN: CPT | Performed by: FAMILY MEDICINE

## 2022-06-03 PROCEDURE — 3075F SYST BP GE 130 - 139MM HG: CPT | Performed by: FAMILY MEDICINE

## 2022-06-03 RX ORDER — PREDNISONE 20 MG/1
TABLET ORAL
Qty: 34 TABLET | Refills: 1 | Status: SHIPPED | OUTPATIENT
Start: 2022-06-03

## 2022-06-03 RX ORDER — MELOXICAM 15 MG/1
15 TABLET ORAL DAILY
Qty: 30 TABLET | Refills: 1 | Status: SHIPPED | OUTPATIENT
Start: 2022-06-03 | End: 2022-08-02

## 2022-06-27 ENCOUNTER — TELEPHONE (OUTPATIENT)
Dept: FAMILY MEDICINE CLINIC | Facility: CLINIC | Age: 67
End: 2022-06-27

## 2022-06-27 NOTE — TELEPHONE ENCOUNTER
Dave Siddiqui from [facility] stated she faxed abnormal results to us earlier today. His result was positive.   If we need to call back anyone who answers can help, use case # [id number]

## 2022-07-29 DIAGNOSIS — I10 ESSENTIAL HYPERTENSION, BENIGN: ICD-10-CM

## 2022-07-29 RX ORDER — LISINOPRIL 10 MG/1
TABLET ORAL
Qty: 90 TABLET | Refills: 1 | Status: SHIPPED | OUTPATIENT
Start: 2022-07-29

## 2022-07-29 RX ORDER — AMLODIPINE BESYLATE 5 MG/1
TABLET ORAL
Qty: 90 TABLET | Refills: 1 | Status: SHIPPED | OUTPATIENT
Start: 2022-07-29

## 2022-08-11 DIAGNOSIS — M19.042 PRIMARY OSTEOARTHRITIS OF BOTH HANDS: ICD-10-CM

## 2022-08-11 DIAGNOSIS — M19.041 PRIMARY OSTEOARTHRITIS OF BOTH HANDS: ICD-10-CM

## 2022-08-12 RX ORDER — MELOXICAM 15 MG/1
TABLET ORAL
Qty: 30 TABLET | Refills: 3 | Status: SHIPPED | OUTPATIENT
Start: 2022-08-12

## 2022-09-13 ENCOUNTER — TELEPHONE (OUTPATIENT)
Dept: FAMILY MEDICINE CLINIC | Facility: CLINIC | Age: 67
End: 2022-09-13

## 2022-09-13 NOTE — TELEPHONE ENCOUNTER
Reports knee pain and inflammation worse over the past 2 weeks. Left knee worse than right knee. He's taking meloxicam for his hx of arthritis, tylenol prn and prednisone which he knows he shouldn't be taking with meloxicam.     He has seen Dr. Raquel Mcrae in the past for knee injections. Discussed with Dr. Nancy Mena. Maritza Cuenca should follow-up with Dr. Raquel Mcrae regarding his current sx. May need more injections for pain relief.

## 2022-09-30 PROBLEM — K63.5 COLON POLYPS: Status: ACTIVE | Noted: 2022-09-30

## 2022-09-30 PROBLEM — K57.30 DIVERTICULOSIS OF COLON: Status: ACTIVE | Noted: 2022-09-30

## 2022-09-30 PROBLEM — R19.5 OCCULT BLOOD IN STOOLS: Status: ACTIVE | Noted: 2022-09-30

## 2022-09-30 PROBLEM — K64.8 INTERNAL HEMORRHOIDS: Status: ACTIVE | Noted: 2022-09-30

## 2022-12-07 DIAGNOSIS — M19.041 PRIMARY OSTEOARTHRITIS OF BOTH HANDS: ICD-10-CM

## 2022-12-07 DIAGNOSIS — M19.042 PRIMARY OSTEOARTHRITIS OF BOTH HANDS: ICD-10-CM

## 2022-12-08 RX ORDER — MELOXICAM 15 MG/1
TABLET ORAL
Qty: 30 TABLET | Refills: 3 | Status: SHIPPED | OUTPATIENT
Start: 2022-12-08

## 2023-01-24 DIAGNOSIS — I10 ESSENTIAL HYPERTENSION, BENIGN: ICD-10-CM

## 2023-01-25 RX ORDER — AMLODIPINE BESYLATE 5 MG/1
TABLET ORAL
Qty: 90 TABLET | Refills: 0 | Status: SHIPPED | OUTPATIENT
Start: 2023-01-25

## 2023-01-25 RX ORDER — LISINOPRIL 10 MG/1
TABLET ORAL
Qty: 90 TABLET | Refills: 0 | Status: SHIPPED | OUTPATIENT
Start: 2023-01-25

## 2023-02-15 DIAGNOSIS — D86.9 SARCOIDOSIS: ICD-10-CM

## 2023-02-15 DIAGNOSIS — M19.041 PRIMARY OSTEOARTHRITIS OF BOTH HANDS: ICD-10-CM

## 2023-02-15 DIAGNOSIS — M19.042 PRIMARY OSTEOARTHRITIS OF BOTH HANDS: ICD-10-CM

## 2023-02-15 RX ORDER — MELOXICAM 15 MG/1
15 TABLET ORAL DAILY
Qty: 30 TABLET | Refills: 3 | Status: CANCELLED | OUTPATIENT
Start: 2023-02-15

## 2023-02-16 RX ORDER — PREDNISONE 20 MG/1
TABLET ORAL
Qty: 34 TABLET | Refills: 1 | Status: SHIPPED | OUTPATIENT
Start: 2023-02-16

## 2023-03-15 DIAGNOSIS — M19.042 PRIMARY OSTEOARTHRITIS OF BOTH HANDS: ICD-10-CM

## 2023-03-15 DIAGNOSIS — M19.041 PRIMARY OSTEOARTHRITIS OF BOTH HANDS: ICD-10-CM

## 2023-03-15 RX ORDER — MELOXICAM 15 MG/1
TABLET ORAL
Qty: 90 TABLET | Refills: 1 | Status: SHIPPED | OUTPATIENT
Start: 2023-03-15

## 2023-03-15 NOTE — TELEPHONE ENCOUNTER
Patient Outreach: spoke with pt and inform him ,he is due for office visit   Future Appointments   Date Time Provider Jose Ying   3/21/2023  1:20 PM Paola Abrams MD [facility] [place]                LOV::6-3-22  LAST LAB:2/4/22  LAST RX:  Medication Quantity Refills Start End   MELOXICAM 15 MG Oral Tab 30 tablet 3 12/8/2022    Sig: Stephanie Obrien 1 TABLET(15 MG) BY MOUTH DAILY     Next OV:No future appointments.    PROTOCOL: none

## 2023-03-21 ENCOUNTER — OFFICE VISIT (OUTPATIENT)
Dept: FAMILY MEDICINE CLINIC | Facility: CLINIC | Age: 68
End: 2023-03-21
Payer: COMMERCIAL

## 2023-03-21 VITALS
RESPIRATION RATE: 18 BRPM | TEMPERATURE: 98 F | DIASTOLIC BLOOD PRESSURE: 72 MMHG | BODY MASS INDEX: 28 KG/M2 | OXYGEN SATURATION: 98 % | HEART RATE: 85 BPM | SYSTOLIC BLOOD PRESSURE: 138 MMHG | WEIGHT: 192 LBS

## 2023-03-21 DIAGNOSIS — M17.11 ARTHRITIS OF RIGHT KNEE: ICD-10-CM

## 2023-03-21 DIAGNOSIS — Z79.899 ENCOUNTER FOR LONG-TERM (CURRENT) USE OF MEDICATIONS: ICD-10-CM

## 2023-03-21 DIAGNOSIS — I10 ESSENTIAL HYPERTENSION, BENIGN: Primary | ICD-10-CM

## 2023-03-21 PROBLEM — R19.5 OCCULT BLOOD IN STOOLS: Status: RESOLVED | Noted: 2022-09-30 | Resolved: 2023-03-21

## 2023-03-21 PROBLEM — G56.03 BILATERAL CARPAL TUNNEL SYNDROME: Status: RESOLVED | Noted: 2021-12-02 | Resolved: 2023-03-21

## 2023-03-21 PROCEDURE — 3078F DIAST BP <80 MM HG: CPT | Performed by: FAMILY MEDICINE

## 2023-03-21 PROCEDURE — 99214 OFFICE O/P EST MOD 30 MIN: CPT | Performed by: FAMILY MEDICINE

## 2023-03-21 PROCEDURE — 3075F SYST BP GE 130 - 139MM HG: CPT | Performed by: FAMILY MEDICINE

## 2023-05-24 DIAGNOSIS — I10 ESSENTIAL HYPERTENSION, BENIGN: ICD-10-CM

## 2023-05-24 RX ORDER — AMLODIPINE BESYLATE 5 MG/1
TABLET ORAL
Qty: 90 TABLET | Refills: 0 | Status: SHIPPED | OUTPATIENT
Start: 2023-05-24

## 2023-05-24 RX ORDER — LISINOPRIL 10 MG/1
TABLET ORAL
Qty: 90 TABLET | Refills: 0 | Status: SHIPPED | OUTPATIENT
Start: 2023-05-24

## 2023-08-15 ENCOUNTER — TELEPHONE (OUTPATIENT)
Dept: FAMILY MEDICINE CLINIC | Facility: CLINIC | Age: 68
End: 2023-08-15

## 2023-08-15 NOTE — TELEPHONE ENCOUNTER
Follow up call to patient injured Right shoulder today when tripped and landed on concrete. No visible swelling and able to move shoulder/arm without too much difficulty. Will try OTC pain med, ice/heat and appointment scheduled for tomorrow. Future Appointments   Date Time Provider Jose Ying   8/16/2023  2:00 PM Mita Bueno MD [facility] [place]     Above reviewed with Dr Abrams.

## 2023-08-16 ENCOUNTER — OFFICE VISIT (OUTPATIENT)
Dept: FAMILY MEDICINE CLINIC | Facility: CLINIC | Age: 68
End: 2023-08-16
Payer: COMMERCIAL

## 2023-08-16 ENCOUNTER — HOSPITAL ENCOUNTER (OUTPATIENT)
Dept: GENERAL RADIOLOGY | Age: 68
Discharge: HOME | End: 2023-08-16
Attending: FAMILY MEDICINE
Payer: COMMERCIAL

## 2023-08-16 VITALS
HEIGHT: 70 IN | HEART RATE: 86 BPM | SYSTOLIC BLOOD PRESSURE: 120 MMHG | RESPIRATION RATE: 16 BRPM | OXYGEN SATURATION: 96 % | TEMPERATURE: 98 F | WEIGHT: 192 LBS | BODY MASS INDEX: 27.49 KG/M2 | DIASTOLIC BLOOD PRESSURE: 76 MMHG

## 2023-08-16 DIAGNOSIS — S30.1XXA CONTUSION OF FLANK, INITIAL ENCOUNTER: ICD-10-CM

## 2023-08-16 DIAGNOSIS — S80.211A ABRASION, RIGHT KNEE, INITIAL ENCOUNTER: ICD-10-CM

## 2023-08-16 DIAGNOSIS — L30.9 DERMATITIS: ICD-10-CM

## 2023-08-16 DIAGNOSIS — M25.511 ACUTE PAIN OF RIGHT SHOULDER: Primary | ICD-10-CM

## 2023-08-16 DIAGNOSIS — S40.811A ABRASION OF RIGHT ARM, INITIAL ENCOUNTER: ICD-10-CM

## 2023-08-16 DIAGNOSIS — M25.511 ACUTE PAIN OF RIGHT SHOULDER: ICD-10-CM

## 2023-08-16 PROCEDURE — 3008F BODY MASS INDEX DOCD: CPT | Performed by: FAMILY MEDICINE

## 2023-08-16 PROCEDURE — 3074F SYST BP LT 130 MM HG: CPT | Performed by: FAMILY MEDICINE

## 2023-08-16 PROCEDURE — 73030 X-RAY EXAM OF SHOULDER: CPT | Performed by: FAMILY MEDICINE

## 2023-08-16 PROCEDURE — 3078F DIAST BP <80 MM HG: CPT | Performed by: FAMILY MEDICINE

## 2023-08-16 PROCEDURE — 99214 OFFICE O/P EST MOD 30 MIN: CPT | Performed by: FAMILY MEDICINE

## 2023-08-19 DIAGNOSIS — I10 ESSENTIAL HYPERTENSION, BENIGN: ICD-10-CM

## 2023-08-21 RX ORDER — AMLODIPINE BESYLATE 5 MG/1
5 TABLET ORAL DAILY
Qty: 90 TABLET | Refills: 0 | Status: SHIPPED | OUTPATIENT
Start: 2023-08-21

## 2023-08-21 RX ORDER — LISINOPRIL 10 MG/1
10 TABLET ORAL DAILY
Qty: 90 TABLET | Refills: 0 | Status: SHIPPED | OUTPATIENT
Start: 2023-08-21

## 2023-09-14 ENCOUNTER — MED REC SCAN ONLY (OUTPATIENT)
Dept: FAMILY MEDICINE CLINIC | Facility: CLINIC | Age: 68
End: 2023-09-14

## 2023-10-10 DIAGNOSIS — M19.041 PRIMARY OSTEOARTHRITIS OF BOTH HANDS: ICD-10-CM

## 2023-10-10 DIAGNOSIS — M19.042 PRIMARY OSTEOARTHRITIS OF BOTH HANDS: ICD-10-CM

## 2023-10-10 RX ORDER — MELOXICAM 15 MG/1
15 TABLET ORAL DAILY
Qty: 90 TABLET | Refills: 1 | Status: SHIPPED | OUTPATIENT
Start: 2023-10-10

## 2023-10-10 NOTE — TELEPHONE ENCOUNTER
Last refill: 03/15/23  Qty: 90  W/ 1 refills  Last ov: 08/16/23    Requested Prescriptions     Pending Prescriptions Disp Refills    MELOXICAM 15 MG Oral Tab [Pharmacy Med Name: MELOXICAM 15MG TABLETS] 90 tablet 1     Sig: TAKE 1 TABLET(15 MG) BY MOUTH DAILY     No future appointments.

## 2023-10-13 ENCOUNTER — MED REC SCAN ONLY (OUTPATIENT)
Dept: FAMILY MEDICINE CLINIC | Facility: CLINIC | Age: 68
End: 2023-10-13

## 2023-10-23 ENCOUNTER — LABORATORY ENCOUNTER (OUTPATIENT)
Dept: LAB | Age: 68
End: 2023-10-23
Attending: FAMILY MEDICINE

## 2023-10-23 ENCOUNTER — OFFICE VISIT (OUTPATIENT)
Dept: FAMILY MEDICINE CLINIC | Facility: CLINIC | Age: 68
End: 2023-10-23
Payer: COMMERCIAL

## 2023-10-23 VITALS
HEIGHT: 70 IN | SYSTOLIC BLOOD PRESSURE: 120 MMHG | HEART RATE: 78 BPM | TEMPERATURE: 98 F | WEIGHT: 190 LBS | OXYGEN SATURATION: 98 % | DIASTOLIC BLOOD PRESSURE: 80 MMHG | BODY MASS INDEX: 27.2 KG/M2

## 2023-10-23 DIAGNOSIS — I10 ESSENTIAL HYPERTENSION, BENIGN: Primary | ICD-10-CM

## 2023-10-23 DIAGNOSIS — Z01.818 PREOP EXAMINATION: ICD-10-CM

## 2023-10-23 DIAGNOSIS — I10 ESSENTIAL HYPERTENSION, BENIGN: ICD-10-CM

## 2023-10-23 LAB
ALBUMIN SERPL-MCNC: 4.3 G/DL (ref 3.4–5)
ALBUMIN/GLOB SERPL: 1.3 {RATIO} (ref 1–2)
ALP LIVER SERPL-CCNC: 92 U/L
ALT SERPL-CCNC: 26 U/L
ANION GAP SERPL CALC-SCNC: 2 MMOL/L (ref 0–18)
AST SERPL-CCNC: 16 U/L (ref 15–37)
ATRIAL RATE: 72 BPM
BASOPHILS # BLD AUTO: 0.05 X10(3) UL (ref 0–0.2)
BASOPHILS NFR BLD AUTO: 0.8 %
BILIRUB SERPL-MCNC: 0.9 MG/DL (ref 0.1–2)
BUN BLD-MCNC: 12 MG/DL (ref 7–18)
CALCIUM BLD-MCNC: 8.8 MG/DL (ref 8.5–10.1)
CHLORIDE SERPL-SCNC: 106 MMOL/L (ref 98–112)
CO2 SERPL-SCNC: 28 MMOL/L (ref 21–32)
CREAT BLD-MCNC: 1.2 MG/DL
EGFRCR SERPLBLD CKD-EPI 2021: 65 ML/MIN/1.73M2 (ref 60–?)
EOSINOPHIL # BLD AUTO: 0.12 X10(3) UL (ref 0–0.7)
EOSINOPHIL NFR BLD AUTO: 2 %
ERYTHROCYTE [DISTWIDTH] IN BLOOD BY AUTOMATED COUNT: 12.9 %
FASTING STATUS PATIENT QL REPORTED: YES
GLOBULIN PLAS-MCNC: 3.4 G/DL (ref 2.8–4.4)
GLUCOSE BLD-MCNC: 100 MG/DL (ref 70–99)
HCT VFR BLD AUTO: 47.6 %
HGB BLD-MCNC: 15.8 G/DL
IMM GRANULOCYTES # BLD AUTO: 0.02 X10(3) UL (ref 0–1)
IMM GRANULOCYTES NFR BLD: 0.3 %
LYMPHOCYTES # BLD AUTO: 1.84 X10(3) UL (ref 1–4)
LYMPHOCYTES NFR BLD AUTO: 31 %
MCH RBC QN AUTO: 29 PG (ref 26–34)
MCHC RBC AUTO-ENTMCNC: 33.2 G/DL (ref 31–37)
MCV RBC AUTO: 87.5 FL
MONOCYTES # BLD AUTO: 0.53 X10(3) UL (ref 0.1–1)
MONOCYTES NFR BLD AUTO: 8.9 %
NEUTROPHILS # BLD AUTO: 3.38 X10 (3) UL (ref 1.5–7.7)
NEUTROPHILS # BLD AUTO: 3.38 X10(3) UL (ref 1.5–7.7)
NEUTROPHILS NFR BLD AUTO: 57 %
OSMOLALITY SERPL CALC.SUM OF ELEC: 282 MOSM/KG (ref 275–295)
P AXIS: 14 DEGREES
P-R INTERVAL: 160 MS
PLATELET # BLD AUTO: 174 10(3)UL (ref 150–450)
POTASSIUM SERPL-SCNC: 4 MMOL/L (ref 3.5–5.1)
PROT SERPL-MCNC: 7.7 G/DL (ref 6.4–8.2)
Q-T INTERVAL: 402 MS
QRS DURATION: 72 MS
QTC CALCULATION (BEZET): 440 MS
R AXIS: 10 DEGREES
RBC # BLD AUTO: 5.44 X10(6)UL
SODIUM SERPL-SCNC: 136 MMOL/L (ref 136–145)
T AXIS: 16 DEGREES
VENTRICULAR RATE: 72 BPM
WBC # BLD AUTO: 5.9 X10(3) UL (ref 4–11)

## 2023-10-23 PROCEDURE — 80053 COMPREHEN METABOLIC PANEL: CPT

## 2023-10-23 PROCEDURE — 36415 COLL VENOUS BLD VENIPUNCTURE: CPT

## 2023-10-23 PROCEDURE — 85025 COMPLETE CBC W/AUTO DIFF WBC: CPT

## 2023-10-25 ENCOUNTER — TELEPHONE (OUTPATIENT)
Dept: FAMILY MEDICINE CLINIC | Facility: CLINIC | Age: 68
End: 2023-10-25

## 2023-11-16 DIAGNOSIS — I10 ESSENTIAL HYPERTENSION, BENIGN: ICD-10-CM

## 2023-11-16 RX ORDER — LISINOPRIL 10 MG/1
10 TABLET ORAL DAILY
Qty: 90 TABLET | Refills: 0 | Status: SHIPPED | OUTPATIENT
Start: 2023-11-16

## 2023-11-16 RX ORDER — AMLODIPINE BESYLATE 5 MG/1
5 TABLET ORAL DAILY
Qty: 90 TABLET | Refills: 0 | Status: SHIPPED | OUTPATIENT
Start: 2023-11-16

## 2023-11-16 NOTE — TELEPHONE ENCOUNTER
Lisinopril  Last refill: 08/21/23  Qty: 90  W/ 0 refills  Last ov: 08/16/23    Amlodipine   Last refill: 08/21/23  Qty: 90  W/ 0 refills  Last ov 08/16/23    Requested Prescriptions     Pending Prescriptions Disp Refills    LISINOPRIL 10 MG Oral Tab [Pharmacy Med Name: LISINOPRIL 10MG TABLETS] 90 tablet 0     Sig: TAKE 1 TABLET(10 MG) BY MOUTH DAILY    AMLODIPINE 5 MG Oral Tab [Pharmacy Med Name: AMLODIPINE BESYLATE 5MG TABLETS] 90 tablet 0     Sig: TAKE 1 TABLET(5 MG) BY MOUTH DAILY     No future appointments.

## 2024-02-12 DIAGNOSIS — I10 ESSENTIAL HYPERTENSION, BENIGN: ICD-10-CM

## 2024-02-12 RX ORDER — AMLODIPINE BESYLATE 5 MG/1
5 TABLET ORAL DAILY
Qty: 90 TABLET | Refills: 0 | Status: SHIPPED | OUTPATIENT
Start: 2024-02-12

## 2024-02-12 RX ORDER — LISINOPRIL 10 MG/1
10 TABLET ORAL DAILY
Qty: 90 TABLET | Refills: 0 | Status: SHIPPED | OUTPATIENT
Start: 2024-02-12

## 2024-02-12 NOTE — TELEPHONE ENCOUNTER
Amlodipine   Last refill: 11/16/23  Qty: 90  W/ 0 refills  Last ov: 08/16/23    Lisinopril  Last refill: 11/16/23  Qty: 90  W/ 0 refills  Last ov: 08/16/23  Requested Prescriptions     Pending Prescriptions Disp Refills    amLODIPine 5 MG Oral Tab 90 tablet 0     Sig: Take 1 tablet (5 mg total) by mouth daily.    lisinopril 10 MG Oral Tab 90 tablet 0     Sig: Take 1 tablet (10 mg total) by mouth daily.     No future appointments.

## 2024-04-09 DIAGNOSIS — L30.9 DERMATITIS: ICD-10-CM

## 2024-04-09 DIAGNOSIS — D86.9 SARCOIDOSIS: ICD-10-CM

## 2024-04-09 RX ORDER — TRIAMCINOLONE ACETONIDE 5 MG/G
CREAM TOPICAL
Qty: 60 G | Refills: 1 | Status: SHIPPED | OUTPATIENT
Start: 2024-04-09

## 2024-04-09 RX ORDER — PREDNISONE 20 MG/1
TABLET ORAL
Qty: 34 TABLET | Refills: 3 | Status: SHIPPED | OUTPATIENT
Start: 2024-04-09

## 2024-04-09 NOTE — TELEPHONE ENCOUNTER
Last refill: 23  Qty: 34 tabs  W/ 1 refills  Last ov: 23    Requested Prescriptions     Pending Prescriptions Disp Refills    predniSONE 20 MG Oral Tab 34 tablet 1     Si=40mg qd 4d then 20 mg qd 21 d then 1/2 tab = 10 mg 7 d     No future appointments.

## 2024-04-09 NOTE — TELEPHONE ENCOUNTER
LOV:10-23-23  LAST LAB: 10-23-23  LAST RX:  halobetasol 0.05 % External Cream 30 g 0 8/16/2023 --   Sig:   Apply bid In thin layer bilateral lower legs       triamcinolone acetonide 0.5 % External Cream 60 g 1 7/14/2021 --   Sig:   Apply topically as needed Tid     Next OV: No future appointments.   PROTOCOL:none

## 2024-05-02 DIAGNOSIS — M19.041 PRIMARY OSTEOARTHRITIS OF BOTH HANDS: ICD-10-CM

## 2024-05-02 DIAGNOSIS — M19.042 PRIMARY OSTEOARTHRITIS OF BOTH HANDS: ICD-10-CM

## 2024-05-02 RX ORDER — MELOXICAM 15 MG/1
15 TABLET ORAL DAILY
Qty: 90 TABLET | Refills: 1 | Status: SHIPPED | OUTPATIENT
Start: 2024-05-02

## 2024-05-02 NOTE — TELEPHONE ENCOUNTER
Last office visit: 10/23/23  Last refill: 10/10/23  Last labs: 10/23/23  No future appointments.  Meloxicam 15 MG Oral TabSig: Take 1 tablet (15 mg total) by mouth daily.Disp: 90 tablet    Refills: 1Start: 5/2/2024Class: NormalFor: Primary osteoarthritis of both handsLast ordered: 6 months ago (10/10/2023) by Noel Abrams MD  Non-Narcotic Pain Medication Protocol [id number]/02/2024 10:03 AM   Protocol Details In person appointment or virtual visit in the past 6 mos or appointment in next 3 mos     To be filled at: [facility][id number] - [place], [place] - [address] AT [facility] [address] [phone], [phone]

## 2024-05-21 DIAGNOSIS — I10 ESSENTIAL HYPERTENSION, BENIGN: ICD-10-CM

## 2024-05-21 RX ORDER — AMLODIPINE BESYLATE 5 MG/1
5 TABLET ORAL DAILY
Qty: 30 TABLET | Refills: 0 | Status: SHIPPED | OUTPATIENT
Start: 2024-05-21

## 2024-05-21 NOTE — TELEPHONE ENCOUNTER
OV 10/23, pre-op RAFY Lorenz  LABS 10/23 comp, cbc     REFILL 02/12/24 #90    No future appointments. Mychart sent, needs to schedule px with PCP. 30 day refill sent.

## 2024-06-25 DIAGNOSIS — I10 ESSENTIAL HYPERTENSION, BENIGN: ICD-10-CM

## 2024-06-25 RX ORDER — AMLODIPINE BESYLATE 5 MG/1
5 TABLET ORAL DAILY
Qty: 90 TABLET | Refills: 0 | Status: SHIPPED | OUTPATIENT
Start: 2024-06-25

## 2024-06-25 NOTE — TELEPHONE ENCOUNTER
AMLODIPINE 5 MG Oral Tab     Hypertension Medications Protocol [id number]/25/2024 08:41 AM   Protocol Details CMP or BMP in past 12 months    Last BP reading less than 140/90    In person appointment or virtual visit in the past 12 mos or appointment in next 3 mos    EGFRCR or GFRNAA > 50      Last office visit:  10/23/23  Future Appointments   Date Time Provider Department Center   7/17/2024 11:20 AM Noel Abrams MD [facility] [place]   Last filled:  5/21/24  #30 with 0   Last labs:  10/23/23 GFR: 65  Last BP: 120/80

## 2024-07-17 ENCOUNTER — OFFICE VISIT (OUTPATIENT)
Dept: FAMILY MEDICINE CLINIC | Facility: CLINIC | Age: 69
End: 2024-07-17
Payer: COMMERCIAL

## 2024-07-17 VITALS
WEIGHT: 199 LBS | RESPIRATION RATE: 16 BRPM | BODY MASS INDEX: 29 KG/M2 | OXYGEN SATURATION: 96 % | TEMPERATURE: 97 F | HEART RATE: 83 BPM | DIASTOLIC BLOOD PRESSURE: 82 MMHG | SYSTOLIC BLOOD PRESSURE: 144 MMHG

## 2024-07-17 DIAGNOSIS — Z79.899 ENCOUNTER FOR LONG-TERM (CURRENT) USE OF MEDICATIONS: Primary | ICD-10-CM

## 2024-07-17 DIAGNOSIS — I10 ESSENTIAL HYPERTENSION, BENIGN: ICD-10-CM

## 2024-07-17 PROCEDURE — 99213 OFFICE O/P EST LOW 20 MIN: CPT | Performed by: FAMILY MEDICINE

## 2024-07-17 RX ORDER — AMLODIPINE BESYLATE 5 MG/1
5 TABLET ORAL DAILY
Qty: 90 TABLET | Refills: 1 | Status: SHIPPED | OUTPATIENT
Start: 2024-07-17

## 2024-07-22 NOTE — PROGRESS NOTES
Rajendra Valdez is a 68 year old male.  Chief Complaint   Patient presents with    Medication Follow-Up       Subjective   HPI:   Patient presents for recheck of his  hypertension. Pt has been taking medications as instructed, no medication side effects, home BP monitoring not recorded    Has higher blood pressure here and feels it has been better out of office   though actually stable  he has been off of the lisinopril and feels well    We will keep off of the lisinopril and monitor            ALLERGIES:  No Known Allergies      has a current medication list which includes the following prescription(s): amlodipine, meloxicam, triamcinolone, halobetasol, lisinopril, acetaminophen, and prednisone.    Past Medical History:    Arthritis    fingers/hands    COVID-19    Symptoms included loss of taste and smell for 3 weeks,  bodyaches, no lingering symptoms, hospitalization not required    Depression    High blood pressure    Pain in joints    knee    Sarcoidosis    Visual impairment    glasses    Wears glasses    reading glasses      Social History:  Social History     Socioeconomic History    Marital status:    Tobacco Use    Smoking status: Never    Smokeless tobacco: Never   Vaping Use    Vaping status: Never Used   Substance and Sexual Activity    Alcohol use: Yes     Alcohol/week: 2.0 standard drinks of alcohol     Types: 2 Standard drinks or equivalent per week    Drug use: No     Social Determinants of Health      [facility]    Housing Stability        BP Readings from Last 6 Encounters:   07/17/24 144/82   10/23/23 120/80   08/16/23 120/76   03/21/23 138/72   06/03/22 138/80   04/16/22 (!) 148/99       Wt Readings from Last 6 Encounters:   07/17/24 199 lb (90.3 kg)   10/23/23 190 lb (86.2 kg)   08/16/23 192 lb (87.1 kg)   03/21/23 192 lb (87.1 kg)   06/03/22 198 lb (89.8 kg)   05/27/22 195 lb (88.5 kg)       REVIEW OF SYSTEMS:   GENERAL  HEALTH: feels well no complaints  SKIN: denies any unusual skin lesions or rashes  RESPIRATORY: denies shortness of breath with exertion  CARDIOVASCULAR: denies chest pain on exertion  NEURO: denies headaches     Objective   EXAM:   /82 (BP Location: Left arm, Patient Position: Sitting, Cuff Size: large)   Pulse 83   Temp 97.4 °F (36.3 °C) (Temporal)   Resp 16   Wt 199 lb (90.3 kg)   SpO2 96%   BMI 28.55 kg/m²  Body mass index is 28.55 kg/m².      GENERAL: well developed, well nourished,in no apparent distress  SKIN: no rashes,no suspicious lesions  NECK: supple,no adenopathy,no bruits  LUNGS: clear to auscultation  CARDIO: RRR without murmur  EXTREMITIES: no cyanosis, clubbing or edema     ASSESSMENT AND PLAN:     1. Encounter for long-term (current) use of medications (Primary)  2. Essential hypertension, benign  Overview:  Blood Pressure and Cardiac Medications            lisinopril 10 MG Oral Tab    AmLODIPine Besylate 5 MG Oral Tab            Orders:  -     amLODIPine Besylate; Take 1 tablet (5 mg total) by mouth daily.  Dispense: 90 tablet; Refill: 1      No orders of the defined types were placed in this encounter.        Follow up in 1 months for blood pressure check nd medications review      Meds & Refills for this Visit:  Requested Prescriptions     Signed Prescriptions Disp Refills    amLODIPine 5 MG Oral Tab 90 tablet 1     Sig: Take 1 tablet (5 mg total) by mouth daily.           Noel Abrams M.D., FAAFP      The patient indicates understanding of these issues and agrees to the plan.             oral

## 2024-08-19 ENCOUNTER — OFFICE VISIT (OUTPATIENT)
Dept: FAMILY MEDICINE CLINIC | Facility: CLINIC | Age: 69
End: 2024-08-19
Payer: COMMERCIAL

## 2024-08-19 VITALS
RESPIRATION RATE: 12 BRPM | TEMPERATURE: 99 F | DIASTOLIC BLOOD PRESSURE: 72 MMHG | WEIGHT: 198 LBS | OXYGEN SATURATION: 96 % | HEIGHT: 70 IN | HEART RATE: 78 BPM | SYSTOLIC BLOOD PRESSURE: 146 MMHG | BODY MASS INDEX: 28.35 KG/M2

## 2024-08-19 DIAGNOSIS — I10 ESSENTIAL HYPERTENSION, BENIGN: ICD-10-CM

## 2024-08-19 DIAGNOSIS — L82.1 SK (SEBORRHEIC KERATOSIS): Primary | ICD-10-CM

## 2024-08-19 PROCEDURE — 17110 DESTRUCTION B9 LES UP TO 14: CPT | Performed by: FAMILY MEDICINE

## 2024-08-19 PROCEDURE — 99214 OFFICE O/P EST MOD 30 MIN: CPT | Performed by: FAMILY MEDICINE

## 2024-08-22 NOTE — PROGRESS NOTES
Subjective:   Rajendra Valdez is a 68 year old male who presents for Derm Problem     Here for evaluation  Of the skin  Has a spot right cheek  never goes away  He nicks with razor at ties    He states he does not use sunscreen  He has no pain and no drainage    History/Other:   has a current medication list which includes the following prescription(s): amlodipine, meloxicam, triamcinolone, halobetasol, prednisone, lisinopril, and acetaminophen.     has No Known Allergies.     Review of Systems:  GENERAL HEALTH: feels well no complaints  SKIN:  see HPI     Objective:   /72 (BP Location: Right arm, Patient Position: Sitting, Cuff Size: large)   Pulse 78   Temp 98.7 °F (37.1 °C) (Temporal)   Resp 12   Ht 5' 10\" (1.778 m)   Wt 198 lb (89.8 kg)   SpO2 96%   BMI 28.41 kg/m²  Estimated body mass index is 28.41 kg/m² as calculated from the following:    Height as of this encounter: 5' 10\" (1.778 m).    Weight as of this encounter: 198 lb (89.8 kg).  GENERAL: well developed, well nourished,in no apparent distress  SKIN: small raised seborrheic keratoses of the facial skin right cheek at zygoma      PROCEDURE  CRYOTHERAPY     Area identified    Cryotherapy applied  Liquid nitrogen application x 2  appled to area to give rim of thermal injury to border of the lesion    Patient tolerated well      Assessment & Plan:   1. SK (seborrheic keratosis) (Primary)  Comments:  cryotherapy  2. Essential hypertension, benign  Comments:  mildly hi   came down w tie and remeasure  will observe  he was also stressed today  if persists hi we will chg meds  Overview:  Blood Pressure and Cardiac Medications            lisinopril 10 MG Oral Tab    AmLODIPine Besylate 5 MG Oral Tab                    Noel Abrams MD, 8/21/2024, 8:58 PM

## 2024-11-08 DIAGNOSIS — M19.042 PRIMARY OSTEOARTHRITIS OF BOTH HANDS: ICD-10-CM

## 2024-11-08 DIAGNOSIS — M19.041 PRIMARY OSTEOARTHRITIS OF BOTH HANDS: ICD-10-CM

## 2024-11-10 RX ORDER — MELOXICAM 15 MG/1
15 TABLET ORAL DAILY
Qty: 90 TABLET | Refills: 1 | Status: SHIPPED | OUTPATIENT
Start: 2024-11-10

## 2025-03-17 DIAGNOSIS — I10 ESSENTIAL HYPERTENSION, BENIGN: ICD-10-CM

## 2025-03-17 NOTE — TELEPHONE ENCOUNTER
LOV: 8-19-24  LAST LAB:10-23-23  LAST RX:  amLODIPine 5 MG Oral Tab 90 tablet 1 7/17/2024 --   Sig:   Take 1 tablet (5 mg total) by mouth daily.     Next OV: No future appointments.   PROTOCOL

## 2025-03-18 RX ORDER — AMLODIPINE BESYLATE 5 MG/1
5 TABLET ORAL DAILY
Qty: 90 TABLET | Refills: 1 | Status: SHIPPED | OUTPATIENT
Start: 2025-03-18

## 2025-07-22 DIAGNOSIS — M19.042 PRIMARY OSTEOARTHRITIS OF BOTH HANDS: ICD-10-CM

## 2025-07-22 DIAGNOSIS — M19.041 PRIMARY OSTEOARTHRITIS OF BOTH HANDS: ICD-10-CM

## 2025-07-22 RX ORDER — MELOXICAM 15 MG/1
15 TABLET ORAL DAILY
Qty: 90 TABLET | Refills: 3 | Status: SHIPPED | OUTPATIENT
Start: 2025-07-22

## 2025-07-22 NOTE — TELEPHONE ENCOUNTER
Last refill: 11/10/24  Qty 90  W/ 1 refills  Last ov: 08/19/24    Requested Prescriptions     Pending Prescriptions Disp Refills    MELOXICAM 15 MG Oral Tab [Pharmacy Med Name: MELOXICAM 15MG TABLETS] 90 tablet 1     Sig: TAKE 1 TABLET(15 MG) BY MOUTH DAILY     No future appointments.

## 2025-08-05 ENCOUNTER — TELEPHONE (OUTPATIENT)
Dept: FAMILY MEDICINE CLINIC | Facility: CLINIC | Age: 70
End: 2025-08-05

## (undated) DIAGNOSIS — Z01.812 ENCOUNTER FOR PREOPERATIVE SCREENING LABORATORY TESTING FOR SEVERE ACUTE RESPIRATORY SYNDROME CORONAVIRUS 2 (SARS-COV-2): Primary | ICD-10-CM

## (undated) DIAGNOSIS — G56.02 CARPAL TUNNEL SYNDROME ON LEFT: Primary | ICD-10-CM

## (undated) DIAGNOSIS — Z13.220 SCREENING, LIPID: ICD-10-CM

## (undated) DIAGNOSIS — Z20.822 ENCOUNTER FOR PREOPERATIVE SCREENING LABORATORY TESTING FOR SEVERE ACUTE RESPIRATORY SYNDROME CORONAVIRUS 2 (SARS-COV-2): Primary | ICD-10-CM

## (undated) DIAGNOSIS — I10 ESSENTIAL HYPERTENSION, BENIGN: Primary | ICD-10-CM

## (undated) DIAGNOSIS — G56.01 CARPAL TUNNEL SYNDROME ON RIGHT: Primary | ICD-10-CM

## (undated) DIAGNOSIS — I10 ESSENTIAL HYPERTENSION, BENIGN: ICD-10-CM

## (undated) DEVICE — STERILE POLYISOPRENE POWDER-FREE SURGICAL GLOVES WITH EMOLLIENT COATING: Brand: PROTEXIS

## (undated) DEVICE — SCD SLEEVE KNEE HI BLEND

## (undated) DEVICE — STANDARD HYPODERMIC NEEDLE,POLYPROPYLENE HUB: Brand: MONOJECT

## (undated) DEVICE — UPPER EXTREMITY CDS-LF: Brand: MEDLINE INDUSTRIES, INC.

## (undated) DEVICE — MINI-BLADE®: Brand: BEAVER®

## (undated) DEVICE — SUTURE MONOCRYL 4-0 PS-2

## (undated) DEVICE — 3M™ TEGADERM™ +PAD FILM DRESSING WITH NON-ADHERENT PAD, 3587, 3-1/2 IN X 4-1/8 IN (9 CM X 10.5 CM), 25/CAR, 4 CAR/CS: Brand: 3M™ TEGADERM™

## (undated) DEVICE — EXOFIN TISSUE ADHESIVE 1.0ML

## (undated) DEVICE — DISPOSABLE BIPOLAR FORCEPS 4" (10.2CM) JEWELERS, STRAIGHT 0.4MM TIP AND 12 FT. (3.6M) CABLE: Brand: KIRWAN

## (undated) DEVICE — GAUZE SPONGES,12 PLY: Brand: CURITY

## (undated) DEVICE — LOW PROFILE (LP) BLADE ASSEMBLY 6PK: Brand: MICROAIRE®

## (undated) DEVICE — SOL  .9 1000ML BTL

## (undated) DEVICE — SOLUTION ENDOSCOPIC ANTI-FOG NON-TOXIC NON-ABRASIVE 6 CUBIC CENTIMETER WITH RADIOPAQUE ADHESIVE-BACKED SPONGE STERILE NOT MADE WITH NATURAL RUBBER LATEX MEDICHOICE: Brand: MEDICHOICE

## (undated) DEVICE — 3M™ STERI-STRIP™ REINFORCED ADHESIVE SKIN CLOSURES, R1547, 1/2 IN X 4 IN (12 MM X 100 MM), 6 STRIPS/ENVELOPE: Brand: 3M™ STERI-STRIP™

## (undated) DEVICE — ALCOHOL 70% 4 OZ

## (undated) DEVICE — DISPOSABLE TOURNIQUET CUFF SINGLE BLADDER, DUAL PORT AND QUICK CONNECT CONNECTOR: Brand: COLOR CUFF

## (undated) DEVICE — STERILE POLYISOPRENE POWDER-FREE SURGICAL GLOVES: Brand: PROTEXIS

## (undated) NOTE — MR AVS SNAPSHOT
After Visit Summary   12/2/2021    Reinier Tucker   MRN: [id number]           Visit Information     Date & Time  12/2/2021  2:15 PM Provider  Mag Keys MD Department  [facility] Neurodiagnostics Dept.  Phone  [phone]      Allergies as of login Username and cannot be changed, so think of one that is secure and easy to remember. 6. Create a [employer] password. You can change your password at any time. 7. Choose a Security Question and enter your Answer and click Next.  This can be used at a l

## (undated) NOTE — LETTER
08/22/17    Thelma Jackson [id number]      Dear [facility],    [address] [facility] records indicate that you still need to complete the stool test given to you at your annual wellness exam.  Please complete the test at your earliest convenience and send

## (undated) NOTE — LETTER
[address] [facility] [address]  [phone]                3/16/2021        Amanda Priest  [address] [place] [address]      Dear Amanda Priest.     To help us provide the

## (undated) NOTE — LETTER
OUTSIDE TESTING RESULT REQUEST     IMPORTANT: FOR YOUR IMMEDIATE ATTENTION  Please FAX all test results listed below to: [phone]     Testing already done on or about: 22-22    * * * * If testing is NOT complete, arrange with patient A.S.A.P. * * * *      Patient Name: Wilbert Salinas  Surgery Date: 2022  CSN: [id number]  Medical Record: [id number]   : 1955 - A: 78 y      Sex: male  Surgeon(s):  Anna Malcolm MD  Procedure: LEFT ENDOSCOPIC CARPAL TUNNEL RELEASE, POSSIBLE OPEN   Anesthesia Type: [place] Block     Surgeon: Anna Malcolm MD     The following Testing and Time Line are REQUIRED PER ANESTHESIA     COVID [address] TEST ON 22 OR 22      Thank You,   Sent by: Yola Dyer  5/13/15

## (undated) NOTE — LETTER
OUTSIDE TESTING RESULT REQUEST     IMPORTANT: FOR YOUR IMMEDIATE ATTENTION  Please FAX all test results listed below to: [phone]     Testing already done on or about: ASAP, surgery is tomorrow     * * * * If testing is NOT complete, arrange with patient A.S.A.P. * * * *      Patient Name: Wyvonna School  Surgery Date: 3/9/2022  CSN: [id number]  Medical Record: [id number]   : 1955 - A: 78 y      Sex: male  Surgeon(s):  Pearline Paget, MD  Procedure: RIGHT ENDOSCOPIC CARPAL TUNNEL RELEASE POSSIBLE OPEN.    Anesthesia Type: [place] Block     Surgeon: Pearline Paget, MD     The following Testing and Time Line are REQUIRED PER ANESTHESIA     EKG READ AND SIGNED WITHIN   90 days      Thank You,   Sent by:Amee Dyer  5/13/15